# Patient Record
Sex: MALE | Race: WHITE | NOT HISPANIC OR LATINO
[De-identification: names, ages, dates, MRNs, and addresses within clinical notes are randomized per-mention and may not be internally consistent; named-entity substitution may affect disease eponyms.]

---

## 2018-03-09 PROBLEM — Z00.00 ENCOUNTER FOR PREVENTIVE HEALTH EXAMINATION: Status: ACTIVE | Noted: 2018-03-09

## 2018-03-23 ENCOUNTER — APPOINTMENT (OUTPATIENT)
Dept: NEPHROLOGY | Facility: CLINIC | Age: 63
End: 2018-03-23
Payer: COMMERCIAL

## 2018-03-23 VITALS
DIASTOLIC BLOOD PRESSURE: 60 MMHG | BODY MASS INDEX: 33.5 KG/M2 | HEIGHT: 70 IN | SYSTOLIC BLOOD PRESSURE: 130 MMHG | HEART RATE: 78 BPM | WEIGHT: 234 LBS

## 2018-03-23 PROCEDURE — 99204 OFFICE O/P NEW MOD 45 MIN: CPT

## 2018-03-23 RX ORDER — CALCIPOTRIENE 50 UG/G
0.01 CREAM TOPICAL
Qty: 120 | Refills: 0 | Status: ACTIVE | COMMUNITY
Start: 2018-02-05

## 2018-04-20 ENCOUNTER — APPOINTMENT (OUTPATIENT)
Dept: NEPHROLOGY | Facility: CLINIC | Age: 63
End: 2018-04-20
Payer: COMMERCIAL

## 2018-04-20 VITALS
BODY MASS INDEX: 33 KG/M2 | WEIGHT: 230 LBS | DIASTOLIC BLOOD PRESSURE: 60 MMHG | SYSTOLIC BLOOD PRESSURE: 128 MMHG | HEART RATE: 76 BPM

## 2018-04-20 PROCEDURE — 99214 OFFICE O/P EST MOD 30 MIN: CPT

## 2018-10-19 ENCOUNTER — APPOINTMENT (OUTPATIENT)
Dept: NEPHROLOGY | Facility: CLINIC | Age: 63
End: 2018-10-19

## 2018-11-28 ENCOUNTER — APPOINTMENT (OUTPATIENT)
Dept: NEPHROLOGY | Facility: CLINIC | Age: 63
End: 2018-11-28
Payer: COMMERCIAL

## 2018-11-28 VITALS — HEART RATE: 70 BPM | DIASTOLIC BLOOD PRESSURE: 70 MMHG | RESPIRATION RATE: 14 BRPM | SYSTOLIC BLOOD PRESSURE: 125 MMHG

## 2018-11-28 PROCEDURE — 99215 OFFICE O/P EST HI 40 MIN: CPT

## 2018-12-04 ENCOUNTER — TRANSCRIPTION ENCOUNTER (OUTPATIENT)
Age: 63
End: 2018-12-04

## 2018-12-28 ENCOUNTER — APPOINTMENT (OUTPATIENT)
Dept: INTERNAL MEDICINE | Facility: CLINIC | Age: 63
End: 2018-12-28

## 2019-04-30 ENCOUNTER — APPOINTMENT (OUTPATIENT)
Dept: NEPHROLOGY | Facility: CLINIC | Age: 64
End: 2019-04-30
Payer: COMMERCIAL

## 2019-04-30 VITALS — HEART RATE: 72 BPM | RESPIRATION RATE: 14 BRPM | DIASTOLIC BLOOD PRESSURE: 58 MMHG | SYSTOLIC BLOOD PRESSURE: 126 MMHG

## 2019-04-30 PROCEDURE — 99215 OFFICE O/P EST HI 40 MIN: CPT

## 2019-04-30 RX ORDER — METFORMIN HYDROCHLORIDE 1000 MG/1
1000 TABLET, COATED ORAL
Qty: 60 | Refills: 0 | Status: DISCONTINUED | COMMUNITY
Start: 2018-02-08 | End: 2019-04-30

## 2019-04-30 NOTE — PHYSICAL EXAM
[General Appearance - Alert] : alert [General Appearance - Well Nourished] : well nourished [Sclera] : the sclera and conjunctiva were normal [Outer Ear] : the ears and nose were normal in appearance [Examination Of The Oral Cavity] : the lips and gums were normal [Oropharynx] : the oropharynx was normal [Respiration, Rhythm And Depth] : normal respiratory rhythm and effort [Auscultation Breath Sounds / Voice Sounds] : lungs were clear to auscultation bilaterally [Heart Rate And Rhythm] : heart rate was normal and rhythm regular [Heart Sounds] : normal S1 and S2 [Murmurs] : no murmurs [Full Pulse] : the pedal pulses are present [Edema] : there was no peripheral edema [Bowel Sounds] : normal bowel sounds [Abdomen Soft] : soft [Abdomen Tenderness] : non-tender [Cervical Lymph Nodes Enlarged Posterior Bilaterally] : posterior cervical [No CVA Tenderness] : no ~M costovertebral angle tenderness [No Spinal Tenderness] : no spinal tenderness [Abnormal Walk] : normal gait [Musculoskeletal - Swelling] : no joint swelling seen [Skin Color & Pigmentation] : normal skin color and pigmentation [] : no rash [Cranial Nerves] : cranial nerves 2-12 were intact [No Focal Deficits] : no focal deficits [Oriented To Time, Place, And Person] : oriented to person, place, and time [Impaired Insight] : insight and judgment were intact

## 2019-05-01 NOTE — ASSESSMENT
[FreeTextEntry1] : 64yo Kisw[place]ili man with DMII, CKD III baseline Cr 2 here for f/u:\par \par # CKD IIIb w non nephrotic proteinuria\par Reviewed 4/20 results w pt. Cr 2 egfr 35 stable at baseline. 2.3g proteinuria, stable, glucosuria. pth 82 secondary hyperpara w normal vit D. confirmed on Losartan 100mg daily max dose.\par - renal sono 3/18 shows 12/11.3cm echogenic kidneys and no PVR. Consistent w diabetic kidney disease however per per optho said no retinopathy, will check secondary  w/u today\par \par # DMII - fair control HbA1C 7.2%, on dapaglifozin now instead of metformin. No retinopathy, upcoming optho deanna this summer. Can use metformin if needed as long as egfr >30. Referred to nutritionist last visit but didn't go. \par \par # Secondary Hyperparathyroidism - well controlled w/o meds, Vit D stores and Ph acceptable\par \par # HTN - well controlled\par \par

## 2019-05-01 NOTE — REVIEW OF SYSTEMS
[As Noted in HPI] : as noted in HPI [Recent Weight Gain (___ Lbs)] : no recent weight gain [Negative] : Constitutional

## 2019-05-01 NOTE — HISTORY OF PRESENT ILLNESS
[FreeTextEntry1] : 62yo Greenland[place] man with DMII, CKD III baseline Cr 2 here for f/u:\par \par PCP Dr. Winston Dawson\par \par Saw PCP a few weeks ago, may make changes in his diabetic medications. He wasn't sure what. overall well controlled he says. Feeling well. \par Checks BP regularly at home, usually 120's/70's. If his bp is high he feels dizzy, hasn't felt this at all recently. \par Weight overall stable, loses but then gains. Not exercising. \par \par OTC: No nsaids. \par \par All other ROS negative.

## 2019-05-03 ENCOUNTER — APPOINTMENT (OUTPATIENT)
Dept: NEPHROLOGY | Facility: CLINIC | Age: 64
End: 2019-05-03

## 2019-05-29 ENCOUNTER — INPATIENT (INPATIENT)
Facility: HOSPITAL | Age: 64
LOS: 0 days | Discharge: HOME | DRG: 355 | End: 2019-05-30
Attending: SURGERY | Admitting: SURGERY
Payer: COMMERCIAL

## 2019-05-29 VITALS
TEMPERATURE: 98 F | HEART RATE: 72 BPM | WEIGHT: 220.02 LBS | SYSTOLIC BLOOD PRESSURE: 131 MMHG | RESPIRATION RATE: 18 BRPM | HEIGHT: 69 IN | OXYGEN SATURATION: 98 % | DIASTOLIC BLOOD PRESSURE: 77 MMHG

## 2019-05-29 LAB
ALBUMIN SERPL ELPH-MCNC: 4 G/DL — SIGNIFICANT CHANGE UP (ref 3.3–5)
ALP SERPL-CCNC: 52 U/L — SIGNIFICANT CHANGE UP (ref 40–120)
ALT FLD-CCNC: 19 U/L — SIGNIFICANT CHANGE UP (ref 10–45)
ANION GAP SERPL CALC-SCNC: 13 MMOL/L — SIGNIFICANT CHANGE UP (ref 5–17)
APTT BLD: 26.2 SEC — LOW (ref 27.5–36.3)
AST SERPL-CCNC: 17 U/L — SIGNIFICANT CHANGE UP (ref 10–40)
BASOPHILS # BLD AUTO: 0.04 K/UL — SIGNIFICANT CHANGE UP (ref 0–0.2)
BASOPHILS NFR BLD AUTO: 0.5 % — SIGNIFICANT CHANGE UP (ref 0–2)
BILIRUB SERPL-MCNC: 0.3 MG/DL — SIGNIFICANT CHANGE UP (ref 0.2–1.2)
BUN SERPL-MCNC: 40 MG/DL — HIGH (ref 7–23)
CALCIUM SERPL-MCNC: 9 MG/DL — SIGNIFICANT CHANGE UP (ref 8.4–10.5)
CHLORIDE SERPL-SCNC: 104 MMOL/L — SIGNIFICANT CHANGE UP (ref 96–108)
CO2 SERPL-SCNC: 23 MMOL/L — SIGNIFICANT CHANGE UP (ref 22–31)
CREAT SERPL-MCNC: 2.18 MG/DL — HIGH (ref 0.5–1.3)
EOSINOPHIL # BLD AUTO: 0.26 K/UL — SIGNIFICANT CHANGE UP (ref 0–0.5)
EOSINOPHIL NFR BLD AUTO: 3.6 % — SIGNIFICANT CHANGE UP (ref 0–6)
GLUCOSE SERPL-MCNC: 148 MG/DL — HIGH (ref 70–99)
HCT VFR BLD CALC: 41.5 % — SIGNIFICANT CHANGE UP (ref 39–50)
HGB BLD-MCNC: 13.9 G/DL — SIGNIFICANT CHANGE UP (ref 13–17)
IMM GRANULOCYTES NFR BLD AUTO: 0.3 % — SIGNIFICANT CHANGE UP (ref 0–1.5)
INR BLD: 0.91 — SIGNIFICANT CHANGE UP (ref 0.88–1.16)
LIDOCAIN IGE QN: 47 U/L — SIGNIFICANT CHANGE UP (ref 7–60)
LYMPHOCYTES # BLD AUTO: 2.08 K/UL — SIGNIFICANT CHANGE UP (ref 1–3.3)
LYMPHOCYTES # BLD AUTO: 28.5 % — SIGNIFICANT CHANGE UP (ref 13–44)
MAGNESIUM SERPL-MCNC: 2 MG/DL — SIGNIFICANT CHANGE UP (ref 1.6–2.6)
MCHC RBC-ENTMCNC: 29.8 PG — SIGNIFICANT CHANGE UP (ref 27–34)
MCHC RBC-ENTMCNC: 33.5 GM/DL — SIGNIFICANT CHANGE UP (ref 32–36)
MCV RBC AUTO: 89.1 FL — SIGNIFICANT CHANGE UP (ref 80–100)
MONOCYTES # BLD AUTO: 0.71 K/UL — SIGNIFICANT CHANGE UP (ref 0–0.9)
MONOCYTES NFR BLD AUTO: 9.7 % — SIGNIFICANT CHANGE UP (ref 2–14)
NEUTROPHILS # BLD AUTO: 4.18 K/UL — SIGNIFICANT CHANGE UP (ref 1.8–7.4)
NEUTROPHILS NFR BLD AUTO: 57.4 % — SIGNIFICANT CHANGE UP (ref 43–77)
NRBC # BLD: 0 /100 WBCS — SIGNIFICANT CHANGE UP (ref 0–0)
PLATELET # BLD AUTO: 187 K/UL — SIGNIFICANT CHANGE UP (ref 150–400)
POTASSIUM SERPL-MCNC: 3.8 MMOL/L — SIGNIFICANT CHANGE UP (ref 3.5–5.3)
POTASSIUM SERPL-SCNC: 3.8 MMOL/L — SIGNIFICANT CHANGE UP (ref 3.5–5.3)
PROT SERPL-MCNC: 6.8 G/DL — SIGNIFICANT CHANGE UP (ref 6–8.3)
PROTHROM AB SERPL-ACNC: 10.3 SEC — SIGNIFICANT CHANGE UP (ref 10–12.9)
RBC # BLD: 4.66 M/UL — SIGNIFICANT CHANGE UP (ref 4.2–5.8)
RBC # FLD: 11.9 % — SIGNIFICANT CHANGE UP (ref 10.3–14.5)
SODIUM SERPL-SCNC: 140 MMOL/L — SIGNIFICANT CHANGE UP (ref 135–145)
WBC # BLD: 7.29 K/UL — SIGNIFICANT CHANGE UP (ref 3.8–10.5)
WBC # FLD AUTO: 7.29 K/UL — SIGNIFICANT CHANGE UP (ref 3.8–10.5)

## 2019-05-29 PROCEDURE — 99285 EMERGENCY DEPT VISIT HI MDM: CPT | Mod: 25

## 2019-05-29 PROCEDURE — 71045 X-RAY EXAM CHEST 1 VIEW: CPT | Mod: 26

## 2019-05-29 PROCEDURE — 93010 ELECTROCARDIOGRAM REPORT: CPT | Mod: NC

## 2019-05-29 RX ORDER — AMPICILLIN SODIUM AND SULBACTAM SODIUM 250; 125 MG/ML; MG/ML
3 INJECTION, POWDER, FOR SUSPENSION INTRAMUSCULAR; INTRAVENOUS ONCE
Refills: 0 | Status: COMPLETED | OUTPATIENT
Start: 2019-05-29 | End: 2019-05-29

## 2019-05-29 NOTE — ED ADULT TRIAGE NOTE - NS ED NURSE DIRECT TO ROOM YN
HISTORY OF PRESENT ILLNESS  Amelia Sharp is a 81 y.o. female. HPI  Depression Review:  Patient is seen for followup of depression. Treatment includes Prozac and had been on 10 mg every other day and doing well-ran out about 1 mo ago and off med more irritable and does not feel as well. Ongoing symptoms include irritable. She denies insomnia, hypersomnia and psychomotor agitation. She experiences the following side effects from the treatment: none. She is on fosamax and no gi sxs-due for follow up dexfrancesca  Uses clinoril rarely for back pain about 20 pills in last year only  Review of Systems   Constitutional: Negative for fever. Gastrointestinal: Negative for abdominal pain, heartburn and nausea. Musculoskeletal: Positive for back pain. Psychiatric/Behavioral: Negative for depression. The patient is not nervous/anxious and does not have insomnia. Physical Exam   Constitutional: She is oriented to person, place, and time. She appears well-developed and well-nourished. HENT:   Head: Normocephalic and atraumatic. Neck: Normal range of motion. Neck supple. Carotid bruit is not present. No thyromegaly present. Cardiovascular: Normal rate, regular rhythm, S1 normal, S2 normal, normal heart sounds and intact distal pulses. No murmur heard. Pulmonary/Chest: Effort normal and breath sounds normal. No respiratory distress. She has no wheezes. She has no rales. Musculoskeletal: She exhibits no edema. Neurological: She is alert and oriented to person, place, and time. Psychiatric: She has a normal mood and affect. Her behavior is normal.   Nursing note and vitals reviewed. ASSESSMENT and PLAN  Diagnoses and all orders for this visit:    1. Spondylosis of lumbar region without myelopathy or radiculopathy  -     sulindac (CLINORIL) 200 mg tablet; Take 1 Tab by mouth daily as needed. appt needed for further refills    2. Dysthymia  -     FLUoxetine (PROZAC) 10 mg capsule;  Take 1 cap by mouth every fourty-eight (48) hours for 90 doses. 3. Localized osteoporosis without current pathological fracture  -     DEXA BONE DENSITY STUDY AXIAL; Future    4. Breast cancer screening  -     GEOVANY 3D JENNIFER W MAMMO BI SCREENING INCL CAD;  Future No

## 2019-05-29 NOTE — ED ADULT NURSE NOTE - NSIMPLEMENTINTERV_GEN_ALL_ED
Implemented All Universal Safety Interventions:  [place] to call system. Call bell, personal items and telephone within reach. Instruct patient to call for assistance. Room bathroom lighting operational. Non-slip footwear when patient is off stretcher. Physically safe environment: no spills, clutter or unnecessary equipment. Stretcher in lowest position, wheels locked, appropriate side rails in place.

## 2019-05-29 NOTE — ED PROVIDER NOTE - OBJECTIVE STATEMENT
63M hx htn, dm, high chol, c/o umbilical pain for past 10 days. pt states increasing redness.  no vomiting, no fevers. no sick contacts. no prior abd surgeries. advised to come to ED per Dr. Llamas.

## 2019-05-29 NOTE — ED PROVIDER NOTE - CLINICAL SUMMARY MEDICAL DECISION MAKING FREE TEXT BOX
umbilical hernia, concern for possible surrounding cellulitis as erythematous  -check labs, ekg, cxr  -seen by surgery - recommend unasyn and admission to [facility] as will likely need surgery

## 2019-05-30 ENCOUNTER — TRANSCRIPTION ENCOUNTER (OUTPATIENT)
Age: 64
End: 2019-05-30

## 2019-05-30 ENCOUNTER — RESULT REVIEW (OUTPATIENT)
Age: 64
End: 2019-05-30

## 2019-05-30 VITALS
HEART RATE: 75 BPM | OXYGEN SATURATION: 95 % | RESPIRATION RATE: 16 BRPM | TEMPERATURE: 98 F | DIASTOLIC BLOOD PRESSURE: 79 MMHG | SYSTOLIC BLOOD PRESSURE: 147 MMHG

## 2019-05-30 DIAGNOSIS — N18.3 CHRONIC KIDNEY DISEASE, STAGE 3 (MODERATE): ICD-10-CM

## 2019-05-30 DIAGNOSIS — E78.00 PURE HYPERCHOLESTEROLEMIA, UNSPECIFIED: ICD-10-CM

## 2019-05-30 DIAGNOSIS — E11.9 TYPE 2 DIABETES MELLITUS WITHOUT COMPLICATIONS: ICD-10-CM

## 2019-05-30 DIAGNOSIS — Z01.810 ENCOUNTER FOR PREPROCEDURAL CARDIOVASCULAR EXAMINATION: ICD-10-CM

## 2019-05-30 DIAGNOSIS — K42.9 UMBILICAL HERNIA WITHOUT OBSTRUCTION OR GANGRENE: ICD-10-CM

## 2019-05-30 DIAGNOSIS — I10 ESSENTIAL (PRIMARY) HYPERTENSION: ICD-10-CM

## 2019-05-30 LAB
ANION GAP SERPL CALC-SCNC: 14 MMOL/L — SIGNIFICANT CHANGE UP (ref 5–17)
APPEARANCE UR: CLEAR — SIGNIFICANT CHANGE UP
BACTERIA # UR AUTO: PRESENT /HPF
BILIRUB UR-MCNC: NEGATIVE — SIGNIFICANT CHANGE UP
BLD GP AB SCN SERPL QL: NEGATIVE — SIGNIFICANT CHANGE UP
BLD GP AB SCN SERPL QL: NEGATIVE — SIGNIFICANT CHANGE UP
BUN SERPL-MCNC: 37 MG/DL — HIGH (ref 7–23)
CALCIUM SERPL-MCNC: 9.1 MG/DL — SIGNIFICANT CHANGE UP (ref 8.4–10.5)
CHLORIDE SERPL-SCNC: 106 MMOL/L — SIGNIFICANT CHANGE UP (ref 96–108)
CO2 SERPL-SCNC: 21 MMOL/L — LOW (ref 22–31)
COLOR SPEC: YELLOW — SIGNIFICANT CHANGE UP
CREAT SERPL-MCNC: 2 MG/DL — HIGH (ref 0.5–1.3)
DIFF PNL FLD: ABNORMAL
EPI CELLS # UR: SIGNIFICANT CHANGE UP /HPF (ref 0–5)
GLUCOSE BLDC GLUCOMTR-MCNC: 126 MG/DL — HIGH (ref 70–99)
GLUCOSE BLDC GLUCOMTR-MCNC: 142 MG/DL — HIGH (ref 70–99)
GLUCOSE SERPL-MCNC: 160 MG/DL — HIGH (ref 70–99)
GLUCOSE UR QL: 500
GRAN CASTS # UR COMP ASSIST: ABNORMAL /LPF
HBA1C BLD-MCNC: 7 % — HIGH (ref 4–5.6)
HCT VFR BLD CALC: 44.4 % — SIGNIFICANT CHANGE UP (ref 39–50)
HCV AB S/CO SERPL IA: 0.1 S/CO — SIGNIFICANT CHANGE UP
HCV AB SERPL-IMP: SIGNIFICANT CHANGE UP
HGB BLD-MCNC: 14.4 G/DL — SIGNIFICANT CHANGE UP (ref 13–17)
KETONES UR-MCNC: NEGATIVE — SIGNIFICANT CHANGE UP
LEUKOCYTE ESTERASE UR-ACNC: ABNORMAL
MCHC RBC-ENTMCNC: 29.5 PG — SIGNIFICANT CHANGE UP (ref 27–34)
MCHC RBC-ENTMCNC: 32.4 GM/DL — SIGNIFICANT CHANGE UP (ref 32–36)
MCV RBC AUTO: 91 FL — SIGNIFICANT CHANGE UP (ref 80–100)
NITRITE UR-MCNC: NEGATIVE — SIGNIFICANT CHANGE UP
NRBC # BLD: 0 /100 WBCS — SIGNIFICANT CHANGE UP (ref 0–0)
PH UR: 6 — SIGNIFICANT CHANGE UP (ref 5–8)
PLATELET # BLD AUTO: 187 K/UL — SIGNIFICANT CHANGE UP (ref 150–400)
POTASSIUM SERPL-MCNC: 3.9 MMOL/L — SIGNIFICANT CHANGE UP (ref 3.5–5.3)
POTASSIUM SERPL-SCNC: 3.9 MMOL/L — SIGNIFICANT CHANGE UP (ref 3.5–5.3)
PROT UR-MCNC: 100 MG/DL
RBC # BLD: 4.88 M/UL — SIGNIFICANT CHANGE UP (ref 4.2–5.8)
RBC # FLD: 11.9 % — SIGNIFICANT CHANGE UP (ref 10.3–14.5)
RBC CASTS # UR COMP ASSIST: < 5 /HPF — SIGNIFICANT CHANGE UP
RH IG SCN BLD-IMP: POSITIVE — SIGNIFICANT CHANGE UP
RH IG SCN BLD-IMP: POSITIVE — SIGNIFICANT CHANGE UP
SODIUM SERPL-SCNC: 141 MMOL/L — SIGNIFICANT CHANGE UP (ref 135–145)
SP GR SPEC: 1.01 — SIGNIFICANT CHANGE UP (ref 1–1.03)
UROBILINOGEN FLD QL: 0.2 E.U./DL — SIGNIFICANT CHANGE UP
WBC # BLD: 7.86 K/UL — SIGNIFICANT CHANGE UP (ref 3.8–10.5)
WBC # FLD AUTO: 7.86 K/UL — SIGNIFICANT CHANGE UP (ref 3.8–10.5)
WBC UR QL: < 5 /HPF — SIGNIFICANT CHANGE UP

## 2019-05-30 PROCEDURE — 99222 1ST HOSP IP/OBS MODERATE 55: CPT

## 2019-05-30 RX ORDER — HEPARIN SODIUM 5000 [USP'U]/ML
5000 INJECTION INTRAVENOUS; SUBCUTANEOUS EVERY 8 HOURS
Refills: 0 | Status: DISCONTINUED | OUTPATIENT
Start: 2019-05-30 | End: 2019-05-30

## 2019-05-30 RX ORDER — HYDROMORPHONE HYDROCHLORIDE 2 MG/ML
0.5 INJECTION INTRAMUSCULAR; INTRAVENOUS; SUBCUTANEOUS EVERY 4 HOURS
Refills: 0 | Status: DISCONTINUED | OUTPATIENT
Start: 2019-05-30 | End: 2019-05-30

## 2019-05-30 RX ORDER — SODIUM CHLORIDE 9 MG/ML
1000 INJECTION INTRAMUSCULAR; INTRAVENOUS; SUBCUTANEOUS ONCE
Refills: 0 | Status: COMPLETED | OUTPATIENT
Start: 2019-05-30 | End: 2019-05-30

## 2019-05-30 RX ORDER — ONDANSETRON 8 MG/1
4 TABLET, FILM COATED ORAL EVERY 6 HOURS
Refills: 0 | Status: DISCONTINUED | OUTPATIENT
Start: 2019-05-30 | End: 2019-05-30

## 2019-05-30 RX ORDER — SODIUM CHLORIDE 9 MG/ML
1000 INJECTION, SOLUTION INTRAVENOUS
Refills: 0 | Status: DISCONTINUED | OUTPATIENT
Start: 2019-05-30 | End: 2019-05-30

## 2019-05-30 RX ORDER — DEXTROSE 50 % IN WATER 50 %
12.5 SYRINGE (ML) INTRAVENOUS ONCE
Refills: 0 | Status: DISCONTINUED | OUTPATIENT
Start: 2019-05-30 | End: 2019-05-30

## 2019-05-30 RX ORDER — GLUCAGON INJECTION, SOLUTION 0.5 MG/.1ML
1 INJECTION, SOLUTION SUBCUTANEOUS ONCE
Refills: 0 | Status: DISCONTINUED | OUTPATIENT
Start: 2019-05-30 | End: 2019-05-30

## 2019-05-30 RX ORDER — INSULIN LISPRO 100/ML
VIAL (ML) SUBCUTANEOUS
Refills: 0 | Status: DISCONTINUED | OUTPATIENT
Start: 2019-05-30 | End: 2019-05-30

## 2019-05-30 RX ORDER — OXYCODONE HYDROCHLORIDE 5 MG/1
1 TABLET ORAL
Qty: 30 | Refills: 0
Start: 2019-05-30

## 2019-05-30 RX ORDER — SODIUM CHLORIDE 9 MG/ML
1000 INJECTION INTRAMUSCULAR; INTRAVENOUS; SUBCUTANEOUS
Refills: 0 | Status: DISCONTINUED | OUTPATIENT
Start: 2019-05-30 | End: 2019-05-30

## 2019-05-30 RX ORDER — HEPARIN SODIUM 5000 [USP'U]/ML
7500 INJECTION INTRAVENOUS; SUBCUTANEOUS EVERY 8 HOURS
Refills: 0 | Status: DISCONTINUED | OUTPATIENT
Start: 2019-05-30 | End: 2019-05-30

## 2019-05-30 RX ORDER — HYDROMORPHONE HYDROCHLORIDE 2 MG/ML
1 INJECTION INTRAMUSCULAR; INTRAVENOUS; SUBCUTANEOUS EVERY 4 HOURS
Refills: 0 | Status: DISCONTINUED | OUTPATIENT
Start: 2019-05-30 | End: 2019-05-30

## 2019-05-30 RX ORDER — CEPHALEXIN 500 MG
250 CAPSULE ORAL EVERY 6 HOURS
Refills: 0 | Status: DISCONTINUED | OUTPATIENT
Start: 2019-05-30 | End: 2019-05-30

## 2019-05-30 RX ORDER — POTASSIUM CHLORIDE 20 MEQ
10 PACKET (EA) ORAL
Refills: 0 | Status: COMPLETED | OUTPATIENT
Start: 2019-05-30 | End: 2019-05-30

## 2019-05-30 RX ORDER — CEFAZOLIN SODIUM 1 G
1000 VIAL (EA) INJECTION EVERY 8 HOURS
Refills: 0 | Status: DISCONTINUED | OUTPATIENT
Start: 2019-05-30 | End: 2019-05-30

## 2019-05-30 RX ORDER — DEXTROSE 50 % IN WATER 50 %
15 SYRINGE (ML) INTRAVENOUS ONCE
Refills: 0 | Status: DISCONTINUED | OUTPATIENT
Start: 2019-05-30 | End: 2019-05-30

## 2019-05-30 RX ADMIN — AMPICILLIN SODIUM AND SULBACTAM SODIUM 200 GRAM(S): 250; 125 INJECTION, POWDER, FOR SUSPENSION INTRAMUSCULAR; INTRAVENOUS at 00:30

## 2019-05-30 RX ADMIN — SODIUM CHLORIDE 500 MILLILITER(S): 9 INJECTION INTRAMUSCULAR; INTRAVENOUS; SUBCUTANEOUS at 01:39

## 2019-05-30 RX ADMIN — HEPARIN SODIUM 5000 UNIT(S): 5000 INJECTION INTRAVENOUS; SUBCUTANEOUS at 05:41

## 2019-05-30 RX ADMIN — Medication 100 MILLIEQUIVALENT(S): at 11:26

## 2019-05-30 RX ADMIN — Medication 250 MILLIGRAM(S): at 05:40

## 2019-05-30 RX ADMIN — Medication 100 MILLIEQUIVALENT(S): at 12:49

## 2019-05-30 RX ADMIN — Medication 100 MILLIGRAM(S): at 08:39

## 2019-05-30 NOTE — DISCHARGE NOTE NURSING/CASE MANAGEMENT/SOCIAL WORK - NSDCDPATPORTLINK_GEN_ALL_CORE
You can access the [facility] Patient Portal, offered by [facility], by registering with the following website: [email or web address]

## 2019-05-30 NOTE — DISCHARGE NOTE PROVIDER - NSDCCPCAREPLAN_GEN_ALL_CORE_FT
PRINCIPAL DISCHARGE DIAGNOSIS  Diagnosis: Umbilical hernia  Assessment and Plan of Treatment: Please follow up with Dr. Llamas in 10 days in [place].  Call the office to schedule your appointment.  You may shower using soap and water over incision sites starting 24 hours after surgery.  Do not scrub incision sites and pat dry when done.  No tub bathing or swimming until confirmation during your follow up appointment.  Keep incision sites out of the sun, as scars will darken.  Ambulate as tolerated, but no heavy lifting (>10lbs) or strenuous exercise.  You may resume regular diet.  You should be urinating at least 3-4x per day.  Call the office if you experience increasing pain, abdominal pain, nausea, vomiting, or temperature >101.4F.  1) Please take Acetaminophen 1,000mg by mouth every 8 hours (maximum of 3,000mg over 24 hours) and Ibuprofen 400mg by mouth every 6 hours (maximum 2,400 mg over 24 hours) as needed for pain  2) Please take Oxycodone 5mg by mouth every 6 hours as needed for severe pain

## 2019-05-30 NOTE — PROGRESS NOTE ADULT - ASSESSMENT
63 M PMH of DM, HTN, Dyslipidemia, no previous surgeries, present with umbilical hernia with skin changes due to cellulitis +/- skin necrosis, admitted for umbilical hernia repair    NPO  IV fluid 140ml/hr, 1L bolus given elevated Cr  Keflex Po 250mg Q6h for cellulitis  Holding BP meds  ISS for DM  Consult  this morning for preop clearance  added on to the OR schedule, pending medicine evaluation 63 M PMH of DM, HTN, Dyslipidemia, no previous surgeries, present with umbilical hernia with skin changes due to cellulitis +/- skin necrosis, admitted for umbilical hernia repair    NPO  IV fluid 140ml/hr, 1L bolus given elevated Cr  Renally dosed IV Antibiotic regimen for umbilical cellulitis  Holding BP meds  ISS for DM  Consult  this morning for preop clearance  added on to the OR schedule, pending medicine evaluation

## 2019-05-30 NOTE — PROGRESS NOTE ADULT - SUBJECTIVE AND OBJECTIVE BOX
SUBJECTIVE: Afebrile, VSS. Pain well controlled, denies N/V. Denies flatus, BM, making appropriate UO.    cephalexin 250 milliGRAM(s) Oral every 6 hours  heparin  Injectable 5000 Unit(s) SubCutaneous every 8 hours      Vital Signs Last 24 Hrs  T(C): 36.5 (30 May 2019 04:50), Max: 36.6 (29 May 2019 22:56)  T(F): 97.7 (30 May 2019 04:50), Max: 97.9 (29 May 2019 22:56)  HR: 72 (30 May 2019 04:50) (63 - 72)  BP: 143/83 (30 May 2019 04:50) (131/77 - 143/83)  BP(mean): --  RR: 17 (30 May 2019 04:50) (16 - 18)  SpO2: 98% (30 May 2019 04:50) (95% - 98%)    General: NAD, resting comfortably in bed  Pulm: Nonlabored breathing, no respiratory distress  Abd: soft, noderately distended, mild tenderness of umbilicus, no rebound, no guarding, 2x3 cm umbilical hernia, reducible, tender to palpation with overlying erythema  Extrem: WWP, no edema, SCDs in place      LABS:                        14.4   7.86  )-----------( 187      ( 30 May 2019 06:56 )             44.4     05-29    140  |  104  |  40<H>  ----------------------------<  148<H>  3.8   |  23  |  2.18<H>    Ca    9.0      29 May 2019 23:28  Mg     2.0     05-29    TPro  6.8  /  Alb  4.0  /  TBili  0.3  /  DBili  x   /  AST  17  /  ALT  19  /  AlkPhos  52  05-29    PT/INR - ( 29 May 2019 23:28 )   PT: 10.3 sec;   INR: 0.91          PTT - ( 29 May 2019 23:28 )  PTT:26.2 sec

## 2019-05-30 NOTE — CONSULT NOTE ADULT - PROBLEM SELECTOR RECOMMENDATION 9
Pt has a good functional status, he is cleared to proceed with surgery.  CRFs include HTN, HLD, T2D and CKD

## 2019-05-30 NOTE — DISCHARGE NOTE PROVIDER - CARE PROVIDER_API CALL
Andrade Llamas)  Surgery  [address] [place] [address], [address]  [place], [place] [address]  Phone: [phone]  Fax: [phone]  Follow Up Time:

## 2019-05-30 NOTE — H&P ADULT - NSHPPHYSICALEXAM_GEN_ALL_CORE
Vital Signs Last 24 Hrs  T(C): 36.6 (29 May 2019 22:56), Max: 36.6 (29 May 2019 22:56)  T(F): 97.9 (29 May 2019 22:56), Max: 97.9 (29 May 2019 22:56)  HR: 72 (29 May 2019 22:56) (72 - 72)  BP: 131/77 (29 May 2019 22:56) (131/77 - 131/77)  BP(mean): --  RR: 18 (29 May 2019 22:56) (18 - 18)  SpO2: 98% (29 May 2019 22:56) (98% - 98%)    Gen: Awake, alert and oriented  Resp: non labored, not in distress  Abdomen:  No previous surgical incision  umbilical and supraumbilical hernia, containing fat, no evidence of incarcerated content   Skin changes over the hernia, mild erythema and ulceration, no fluctuance or collection  Soft, ND, mild tenderness over the hernia site

## 2019-05-30 NOTE — H&P ADULT - HISTORY OF PRESENT ILLNESS
63 Year old, PMH of DM, HTN, Dyslipidemia, no previous surgeries, present with umbilical hernia that has been increasing in size and getting stuck (according to the patient), he complains of pain and discomfort, 1 week ago he started some erythema and ulceration over it, he denies any nausea/vomiting, no constipation, able to pass stool and gas, afebrile, no hematuria or dysuria

## 2019-05-30 NOTE — CONSULT NOTE ADULT - SUBJECTIVE AND OBJECTIVE BOX
Patient is a 63y old  Male who presents with a chief complaint of Supraumbilical and umbilical hernia with skin necrosis (30 May 2019 07:21)      HPI:  63 Year old, PMH of DM, HTN, Dyslipidemia, no previous surgeries, present with umbilical hernia that has been increasing in size and getting stuck (according to the patient), he complains of pain and discomfort, 1 week ago he started some erythema and ulceration over it, he denies any nausea/vomiting, no constipation, able to pass stool and gas, afebrile, no hematuria or dysuria (30 May 2019 00:45)      PAST MEDICAL & SURGICAL HISTORY:  High cholesterol  HTN (hypertension)  DM (diabetes mellitus)  No significant past surgical history      PREVIOUS DIAGNOSTIC TESTING:      ECHO  FINDINGS:    STRESS  FINDINGS:    CATHETERIZATION  FINDINGS:    MEDICATIONS  (STANDING):  dextrose 5%. 1000 milliLiter(s) (50 mL/Hr) IV Continuous <Continuous>  dextrose 50% Injectable 12.5 Gram(s) IV Push once  heparin  Injectable 5000 Unit(s) SubCutaneous every 8 hours  insulin lispro (HumaLOG) corrective regimen sliding scale   SubCutaneous Before meals and at bedtime  lactated ringers. 1000 milliLiter(s) (140 mL/Hr) IV Continuous <Continuous>    MEDICATIONS  (PRN):  dextrose 40% Gel 15 Gram(s) Oral once PRN Blood Glucose LESS THAN 70 milliGRAM(s)/deciliter  glucagon  Injectable 1 milliGRAM(s) IntraMuscular once PRN Glucose LESS THAN 70 milligrams/deciliter  HYDROmorphone  Injectable 0.5 milliGRAM(s) IV Push every 4 hours PRN Moderate Pain (4 - 6)  HYDROmorphone  Injectable 1 milliGRAM(s) IV Push every 4 hours PRN Severe Pain (7 - 10)  ondansetron Injectable 4 milliGRAM(s) IV Push every 6 hours PRN Nausea      FAMILY HISTORY:      SOCIAL HISTORY:    CIGARETTES: no    ALCOHOL:   no    REVIEW OF SYSTEMS:  CONSTITUTIONAL: No fever, weight loss, or fatigue  EYES: No eye pain, visual disturbances, or discharge  ENMT:  No difficulty hearing, tinnitus, vertigo; No sinus or throat pain  NECK: No pain or stiffness  RESPIRATORY: No cough, wheezing, chills or hemoptysis; No Shortness of Breath  CARDIOVASCULAR: No chest pain, palpitations, passing out, dizziness, or leg swelling  GASTROINTESTINAL: + abdominal or epigastric pain. No nausea, vomiting, or hematemesis; No diarrhea or constipation. No melena or hematochezia.  GENITOURINARY: No dysuria, frequency, hematuria, or incontinence  NEUROLOGICAL: No headaches, memory loss, loss of strength, numbness, or tremors  SKIN: No itching, burning, rashes, or lesions   LYMPH Nodes: No enlarged glands  ENDOCRINE: No heat or cold intolerance; No hair loss  MUSCULOSKELETAL: No joint pain or swelling; No muscle, back, or extremity pain  PSYCHIATRIC: No depression, anxiety, mood swings, or difficulty sleeping  HEME/LYMPH: No easy bruising, or bleeding gums  ALLERY AND IMMUNOLOGIC: No hives or eczema  	  Vital Signs Last 24 Hrs  T(C): 36.5 (30 May 2019 04:50), Max: 36.6 (29 May 2019 22:56)  T(F): 97.7 (30 May 2019 04:50), Max: 97.9 (29 May 2019 22:56)  HR: 72 (30 May 2019 04:50) (63 - 72)  BP: 143/83 (30 May 2019 04:50) (131/77 - 143/83)  BP(mean): --  RR: 17 (30 May 2019 04:50) (16 - 18)  SpO2: 98% (30 May 2019 04:50) (95% - 98%)    PHYSICAL EXAM:  Appearance: Normal	  HEENT:   Normal oral mucosa, PERRL, EOMI	  Lymphatic: No lymphadenopathy  Cardiovascular: Normal S1 S2, No JVD, No murmurs, No edema  Respiratory: Lungs clear to auscultation	  Psychiatry: A & O x 3, Mood & affect appropriate  Gastrointestinal:  Soft, + BS, skin necrosis over umbilical ulcer	  Skin: No rashes, No ecchymoses, No cyanosis  Neurologic: Non-focal  Extremities: Normal range of motion, No clubbing, cyanosis or edema  Vascular: Peripheral pulses palpable 2+ bilaterally      INTERPRETATION OF TELEMETRY:    ECG:    I&O's Detail    29 May 2019 07:01  -  30 May 2019 07:00  --------------------------------------------------------  IN:  Total IN: 0 mL    OUT:    Voided: 600 mL  Total OUT: 600 mL    Total NET: -600 mL          LABS:                        14.4   7.86  )-----------( 187      ( 30 May 2019 06:56 )             44.4         141  |  106  |  37<H>  ----------------------------<  160<H>  3.9   |  21<L>  |  2.00<H>    Ca    9.1      30 May 2019 06:56  Mg     2.0         TPro  6.8  /  Alb  4.0  /  TBili  0.3  /  DBili  x   /  AST  17  /  ALT  19  /  AlkPhos  52          PT/INR - ( 29 May 2019 23:28 )   PT: 10.3 sec;   INR: 0.91          PTT - ( 29 May 2019 23:28 )  PTT:26.2 sec  Urinalysis Basic - ( 30 May 2019 01:34 )    Color: Yellow / Appearance: Clear / S.015 / pH: x  Gluc: x / Ketone: NEGATIVE  / Bili: Negative / Urobili: 0.2 E.U./dL   Blood: x / Protein: 100 mg/dL / Nitrite: NEGATIVE   Leuk Esterase: Trace / RBC: < 5 /HPF / WBC < 5 /HPF   Sq Epi: x / Non Sq Epi: 0-5 /HPF / Bacteria: Present /HPF      I&O's Summary    29 May 2019 07:01  -  30 May 2019 07:00  --------------------------------------------------------  IN: 0 mL / OUT: 600 mL / NET: -600 mL      BNP  RADIOLOGY & ADDITIONAL STUDIES:

## 2019-05-30 NOTE — H&P ADULT - NSHPLABSRESULTS_GEN_ALL_CORE
13.9   7.29  )-----------( 187      ( 29 May 2019 23:28 )             41.5     05-29    140  |  104  |  40<H>  ----------------------------<  148<H>  3.8   |  23  |  2.18<H>    Ca    9.0      29 May 2019 23:28  Mg     2.0     05-29    TPro  6.8  /  Alb  4.0  /  TBili  0.3  /  DBili  x   /  AST  17  /  ALT  19  /  AlkPhos  52  05-29

## 2019-05-30 NOTE — H&P ADULT - ASSESSMENT
63 Year old, PMH of DM, HTN, Dyslipidemia, no previous surgeries, present with umbilical hernia with skin changes due to cellulitis +/- skin necrosis, admitted for umbilical hernia repair      Recs:  Admit to regional  NPO  IV fluid 140ml/hr, 1L bolus given elevated Cr  Keflex Po 250mg Q6h for cellulitis  Holding BP meds  ISS for DM  Consult  in the morning for preop clearance  added on to the OR schedule, pending medicine evaluation  Discussed with

## 2019-05-30 NOTE — DISCHARGE NOTE PROVIDER - HOSPITAL COURSE
63 Year old, PMH of DM, HTN, Dyslipidemia, no previous surgeries, present with umbilical hernia that has been increasing in size and getting stuck (according to the patient), he complains of pain and discomfort, 1 week ago he started some erythema and ulceration over it, he denies any nausea/vomiting, no constipation, able to pass stool and gas, afebrile, no hematuria or dysuria. The patient was admitted to [facility] on 5/29/2019 for an umbilical hernia with overlying skin ulceration.  The patient had this repaired in the OR on 5/30.  At the time of discharge, the patient's pain was controlled by oral medications, tolerated a regular diet, able to void, and had a soft/nontender abdomen with clean, dry, and intact incisions.

## 2019-05-31 LAB
ANION GAP SERPL CALC-SCNC: 9 MMOL/L — SIGNIFICANT CHANGE UP (ref 5–17)
BUN SERPL-MCNC: 19 MG/DL — SIGNIFICANT CHANGE UP (ref 7–23)
CALCIUM SERPL-MCNC: 8.4 MG/DL — SIGNIFICANT CHANGE UP (ref 8.4–10.5)
CHLORIDE SERPL-SCNC: 100 MMOL/L — SIGNIFICANT CHANGE UP (ref 96–108)
CHOLEST SERPL-MCNC: 99 MG/DL — SIGNIFICANT CHANGE UP (ref 10–199)
CO2 SERPL-SCNC: 27 MMOL/L — SIGNIFICANT CHANGE UP (ref 22–31)
CREAT SERPL-MCNC: 1.13 MG/DL — SIGNIFICANT CHANGE UP (ref 0.5–1.3)
GLUCOSE SERPL-MCNC: 180 MG/DL — HIGH (ref 70–99)
HCT VFR BLD CALC: 38.9 % — LOW (ref 39–50)
HDLC SERPL-MCNC: 55 MG/DL — SIGNIFICANT CHANGE UP
HGB BLD-MCNC: 13.1 G/DL — SIGNIFICANT CHANGE UP (ref 13–17)
LIPID PNL WITH DIRECT LDL SERPL: 37 MG/DL — SIGNIFICANT CHANGE UP
MAGNESIUM SERPL-MCNC: 1.8 MG/DL — SIGNIFICANT CHANGE UP (ref 1.6–2.6)
MCHC RBC-ENTMCNC: 31.7 PG — SIGNIFICANT CHANGE UP (ref 27–34)
MCHC RBC-ENTMCNC: 33.7 GM/DL — SIGNIFICANT CHANGE UP (ref 32–36)
MCV RBC AUTO: 94.2 FL — SIGNIFICANT CHANGE UP (ref 80–100)
NRBC # BLD: 0 /100 WBCS — SIGNIFICANT CHANGE UP (ref 0–0)
PHOSPHATE SERPL-MCNC: 3.4 MG/DL — SIGNIFICANT CHANGE UP (ref 2.5–4.5)
PLATELET # BLD AUTO: 162 K/UL — SIGNIFICANT CHANGE UP (ref 150–400)
POTASSIUM SERPL-MCNC: 5.1 MMOL/L — SIGNIFICANT CHANGE UP (ref 3.5–5.3)
POTASSIUM SERPL-SCNC: 5.1 MMOL/L — SIGNIFICANT CHANGE UP (ref 3.5–5.3)
RBC # BLD: 4.13 M/UL — LOW (ref 4.2–5.8)
RBC # FLD: 11.5 % — SIGNIFICANT CHANGE UP (ref 10.3–14.5)
SODIUM SERPL-SCNC: 136 MMOL/L — SIGNIFICANT CHANGE UP (ref 135–145)
TOTAL CHOLESTEROL/HDL RATIO MEASUREMENT: 1.8 RATIO — LOW (ref 3.4–9.6)
TRIGL SERPL-MCNC: 34 MG/DL — SIGNIFICANT CHANGE UP (ref 10–149)
WBC # BLD: 17.18 K/UL — HIGH (ref 3.8–10.5)
WBC # FLD AUTO: 17.18 K/UL — HIGH (ref 3.8–10.5)

## 2019-06-03 PROCEDURE — 99285 EMERGENCY DEPT VISIT HI MDM: CPT

## 2019-06-03 PROCEDURE — 80053 COMPREHEN METABOLIC PANEL: CPT

## 2019-06-03 PROCEDURE — 80061 LIPID PANEL: CPT

## 2019-06-03 PROCEDURE — 93005 ELECTROCARDIOGRAM TRACING: CPT

## 2019-06-03 PROCEDURE — 36415 COLL VENOUS BLD VENIPUNCTURE: CPT

## 2019-06-03 PROCEDURE — 86850 RBC ANTIBODY SCREEN: CPT

## 2019-06-03 PROCEDURE — 83735 ASSAY OF MAGNESIUM: CPT

## 2019-06-03 PROCEDURE — 83690 ASSAY OF LIPASE: CPT

## 2019-06-03 PROCEDURE — 80048 BASIC METABOLIC PNL TOTAL CA: CPT

## 2019-06-03 PROCEDURE — 81001 URINALYSIS AUTO W/SCOPE: CPT

## 2019-06-03 PROCEDURE — 88304 TISSUE EXAM BY PATHOLOGIST: CPT

## 2019-06-03 PROCEDURE — 86803 HEPATITIS C AB TEST: CPT

## 2019-06-03 PROCEDURE — 71045 X-RAY EXAM CHEST 1 VIEW: CPT

## 2019-06-03 PROCEDURE — 86900 BLOOD TYPING SEROLOGIC ABO: CPT

## 2019-06-03 PROCEDURE — 85027 COMPLETE CBC AUTOMATED: CPT

## 2019-06-03 PROCEDURE — 85730 THROMBOPLASTIN TIME PARTIAL: CPT

## 2019-06-03 PROCEDURE — 84100 ASSAY OF PHOSPHORUS: CPT

## 2019-06-03 PROCEDURE — 85025 COMPLETE CBC W/AUTO DIFF WBC: CPT

## 2019-06-03 PROCEDURE — C1781: CPT

## 2019-06-03 PROCEDURE — 85610 PROTHROMBIN TIME: CPT

## 2019-06-03 PROCEDURE — 86901 BLOOD TYPING SEROLOGIC RH(D): CPT

## 2019-06-03 PROCEDURE — 82962 GLUCOSE BLOOD TEST: CPT

## 2019-06-03 PROCEDURE — 83036 HEMOGLOBIN GLYCOSYLATED A1C: CPT

## 2019-06-06 LAB — SURGICAL PATHOLOGY STUDY: SIGNIFICANT CHANGE UP

## 2019-06-10 DIAGNOSIS — K43.6 OTHER AND UNSPECIFIED VENTRAL HERNIA WITH OBSTRUCTION, WITHOUT GANGRENE: ICD-10-CM

## 2019-06-10 DIAGNOSIS — E78.5 HYPERLIPIDEMIA, UNSPECIFIED: ICD-10-CM

## 2019-06-10 DIAGNOSIS — I12.9 HYPERTENSIVE CHRONIC KIDNEY DISEASE WITH STAGE 1 THROUGH STAGE 4 CHRONIC KIDNEY DISEASE, OR UNSPECIFIED CHRONIC KIDNEY DISEASE: ICD-10-CM

## 2019-06-10 DIAGNOSIS — E11.9 TYPE 2 DIABETES MELLITUS WITHOUT COMPLICATIONS: ICD-10-CM

## 2019-06-10 DIAGNOSIS — N18.3 CHRONIC KIDNEY DISEASE, STAGE 3 (MODERATE): ICD-10-CM

## 2019-06-10 DIAGNOSIS — K42.0 UMBILICAL HERNIA WITH OBSTRUCTION, WITHOUT GANGRENE: ICD-10-CM

## 2020-04-29 ENCOUNTER — APPOINTMENT (OUTPATIENT)
Dept: NEPHROLOGY | Facility: CLINIC | Age: 65
End: 2020-04-29

## 2020-04-30 PROBLEM — E78.00 PURE HYPERCHOLESTEROLEMIA, UNSPECIFIED: Chronic | Status: ACTIVE | Noted: 2019-05-29

## 2020-04-30 PROBLEM — I10 ESSENTIAL (PRIMARY) HYPERTENSION: Chronic | Status: ACTIVE | Noted: 2019-05-29

## 2020-05-04 ENCOUNTER — APPOINTMENT (OUTPATIENT)
Dept: NEPHROLOGY | Facility: CLINIC | Age: 65
End: 2020-05-04
Payer: COMMERCIAL

## 2020-05-04 ENCOUNTER — TRANSCRIPTION ENCOUNTER (OUTPATIENT)
Age: 65
End: 2020-05-04

## 2020-05-04 PROCEDURE — 99214 OFFICE O/P EST MOD 30 MIN: CPT | Mod: 95

## 2020-05-04 NOTE — ASSESSMENT
[FreeTextEntry1] : 64-year-old man with relatively stable CKD 3B probably due to diabetic nephropathy but watching light chains/possible monoclonal gammopathy.  He has no back pain or hypercalcemia to suggest multiple myeloma.  Importantly, his sugar is poorly controlled and I suggested he see his PCP to discuss.  I also implored him to watch his diet more closely, and walk daily for at least 30 minutes.  I suggested he have blood work again within a few months and certainly see Dr. Ibarra within 5 to 6 months.

## 2020-05-04 NOTE — HISTORY OF PRESENT ILLNESS
[Home] : at home, [unfilled] , at the time of the visit. [Medical Office: ([facility])___] : at the medical office located in  [Patient] : the patient [Other:____] : [unfilled] [Self] : self [FreeTextEntry1] : Discussed with patient : You have chosen to receive care through the use of tele-media.  It enables healthcare providers at different locations to provide safe, effective, and convenient care through the use of technology.  Please note this is a billable encounter.  As with any healthcare service, there are risks associated with the use of tele-media, including  issues.  You understand that I cannot physically examine you and that you may need to come to the office to complete the assessment.   Patient agreed verbally and understands the risks and benefits of tele-media as explained.  All questions regarding tele-media encounters were answered.\par                                                                                                                                                                                         64-year-old man with a history of CKD 3B, type 2 diabetes, hypertension, hyperlipidemia, SH PT, metabolic acidosis, last seen in November 2018 by Dr. Ibarra.  His BP has generally been okay at home but occasionally spikes as high as 150/90, on amlodipine 10 mg daily and losartan 100 mg daily.  He has no uremic symptoms.  He has had no COVID symptoms.  His blood work last month shows basically stable renal function with a creatinine of 2.1 compared to 2.0 a year ago.  His PTH is normal.  But his A1c is 9.2, and he has not been following diet or exercising.  He is on metformin and farxiga.  He also exhibited a kappa light chains of 53 with normal up to 19, and lambda light chains of 39 with normal up to 26.  However his kappa to lambda ratio was normal.  His CINDY 2 was also normal.  He is HIV negative, hepatitis B and C-.  His hemoglobin is 15.  His uric acid is 8.4, but he has had no acute gout attacks.  His protein to creatinine ratio was 2833, but he denies edema.

## 2020-05-04 NOTE — PHYSICAL EXAM
[General Appearance - Alert] : alert [General Appearance - In No Acute Distress] : in no acute distress [PERRL With Normal Accommodation] : pupils were equal in size, round, and reactive to light [Sclera] : the sclera and conjunctiva were normal [Outer Ear] : the ears and nose were normal in appearance [Neck Cervical Mass (___cm)] : no neck mass was observed [Neck Appearance] : the appearance of the neck was normal [Skin Color & Pigmentation] : normal skin color and pigmentation [Skin Turgor] : normal skin turgor [] : no rash [Deep Tendon Reflexes (DTR)] : deep tendon reflexes were 2+ and symmetric [Sensation] : the sensory exam was normal to light touch and pinprick [No Focal Deficits] : no focal deficits [Oriented To Time, Place, And Person] : oriented to person, place, and time [Impaired Insight] : insight and judgment were intact [Affect] : the affect was normal

## 2021-02-18 ENCOUNTER — APPOINTMENT (OUTPATIENT)
Dept: NEPHROLOGY | Facility: CLINIC | Age: 66
End: 2021-02-18
Payer: COMMERCIAL

## 2021-02-18 VITALS
SYSTOLIC BLOOD PRESSURE: 124 MMHG | BODY MASS INDEX: 33.07 KG/M2 | DIASTOLIC BLOOD PRESSURE: 65 MMHG | HEIGHT: 70 IN | WEIGHT: 231 LBS

## 2021-02-18 PROCEDURE — 99214 OFFICE O/P EST MOD 30 MIN: CPT | Mod: 95

## 2021-02-18 NOTE — HISTORY OF PRESENT ILLNESS
[Home] : at home, [unfilled] , at the time of the visit. [Medical Office: ([facility])___] : at the medical office located in  [Verbal consent obtained from patient] : the patient, [unfilled] [FreeTextEntry1] : Discussed with patient : You have chosen to receive care through the use of tele-media.  It enables healthcare providers at different locations to provide safe, effective, and convenient care through the use of technology.  Please note this is a billable encounter.  As with any healthcare service, there are risks associated with the use of tele-media, including  issues.  You understand that I cannot physically examine you and that you may need to come to the office to complete the assessment.   Patient agreed verbally and understands the risks and benefits of tele-media as explained.  All questions regarding tele-media encounters were answered.\par        64-year-old man with a history of hypertension, type 2 diabetes, hyperlipidemia, secondary hyperparathyroidism, hyperuricemia last seen virtually in May 2020.  His creatinine was stable at that time in the range of 2.0-2.1.  However, it is now up to 2.43, and his GFR is down to 28, so he is now stage IV CKD.  His potassium is well controlled at 4.4, CO2 23, hemoglobin 14.4, uric acid 8.5, urine albumin to creatinine ratio has dropped from about 2800 down to 2203.  His calcium is 9.3 with a PTH of 113 and the lab failed to do phosphorus.  His BP is well controlled, running an average of about 125 at home systolic.  His blood sugar is problematic with an A1c of 10.4.  He has no apparent neuropathy or retinopathy.  His appetite is okay and he has no pruritus.  His daughter was on the call and is very supportive.

## 2021-02-18 NOTE — ASSESSMENT
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  I urged him to try to lower his A1c.  His diet is suboptimal, and he is too sedentary.  He is on Farxiga, which is excellent -the DAPA-CKD outcomes trial in the [place] [employer] in September demonstrated excellent cardio renal protection.  He has no history of gout attacks despite his elevated uric acid.  His hemoglobin remains remarkably high considering his GFR.  I have asked him to make an appointment again within 4 months, and sooner if if it appears that his renal function has worsened or his BP is elevated.

## 2021-05-04 DIAGNOSIS — N18.32 CHRONIC KIDNEY DISEASE, STAGE 3B: ICD-10-CM

## 2021-05-10 ENCOUNTER — APPOINTMENT (OUTPATIENT)
Dept: NEPHROLOGY | Facility: CLINIC | Age: 66
End: 2021-05-10
Payer: COMMERCIAL

## 2021-05-10 VITALS
DIASTOLIC BLOOD PRESSURE: 77 MMHG | BODY MASS INDEX: 32.93 KG/M2 | HEART RATE: 76 BPM | WEIGHT: 230 LBS | SYSTOLIC BLOOD PRESSURE: 128 MMHG | HEIGHT: 70 IN

## 2021-05-10 PROCEDURE — 99072 ADDL SUPL MATRL&STAF TM PHE: CPT

## 2021-05-10 PROCEDURE — 99214 OFFICE O/P EST MOD 30 MIN: CPT

## 2021-05-10 NOTE — HISTORY OF PRESENT ILLNESS
[FreeTextEntry1] : 65-year-old man with a history of hypertension and type 2 diabetes for years, hyperlipidemia, CKD 4, secondary hyperparathyroidism, hyperuricemia, with a surprisingly high hemoglobin for his level of renal function.  His creatinine is stable at about 2.4 with a K of 4.0, CO2 25.  His calcium and phosphorus are normal.  He has proteinuria that is 2+ on urinalysis and about 600 on microalbumin.  His ultrasound 3 years ago showed normal-sized kidneys with the left looking echogenic.  His home BP is good with a average of about 125 systolic.  His sugar is much better controlled now having dropped from an A1c of 10.4 down to 7.9.  Farxiga was added, and will hopefully provide cardiac and renal protection as well.  He plans to go to [facility] over the summer and will be more physically active there.

## 2021-05-10 NOTE — ASSESSMENT
[FreeTextEntry1] : 65-year-old man with well-controlled hypertension, improving but still suboptimal diabetes control, and stable renal function/stage IV CKD.  He has no gout symptoms.  He also has no pruritus, no evidence of neuropathy or retinopathy.  He will remain on to nephro protective medications, losartan 100 mg daily and Farxiga 5 mg daily.  He is reminded to avoid NSAIDs.  I encouraged him regarding his improved blood sugar and urged him to continue being diligent with diet and exercise.  I have ordered labs to be done before his next visit, including BMP, phosphorus, PTH, A1c, and urine microalbumin.  He will return in September, after coming back from [facility].

## 2021-05-10 NOTE — PHYSICAL EXAM
[General Appearance - Alert] : alert [General Appearance - In No Acute Distress] : in no acute distress [Sclera] : the sclera and conjunctiva were normal [PERRL With Normal Accommodation] : pupils were equal in size, round, and reactive to light [Outer Ear] : the ears and nose were normal in appearance [Neck Appearance] : the appearance of the neck was normal [Neck Cervical Mass (___cm)] : no neck mass was observed [Jugular Venous Distention Increased] : there was no jugular-venous distention [Skin Color & Pigmentation] : normal skin color and pigmentation [Skin Turgor] : normal skin turgor [] : no rash [Deep Tendon Reflexes (DTR)] : deep tendon reflexes were 2+ and symmetric [Sensation] : the sensory exam was normal to light touch and pinprick [No Focal Deficits] : no focal deficits [Oriented To Time, Place, And Person] : oriented to person, place, and time [Impaired Insight] : insight and judgment were intact [Affect] : the affect was normal

## 2021-05-10 NOTE — CONSULT LETTER
[Dear  ___] : Dear  [unfilled], [Consult Letter:] : I had the pleasure of evaluating your patient, [unfilled]. [Please see my note below.] : Please see my note below. [Consult Closing:] : Thank you very much for allowing me to participate in the care of this patient.  If you have any questions, please do not hesitate to contact me. [Sincerely,] : Sincerely, [FreeTextEntry2] : Dr Winston Dawson [FreeTextEntry3] : Sincerely, \par \par Phillip Ruiz MD, FACP

## 2021-11-01 ENCOUNTER — TRANSCRIPTION ENCOUNTER (OUTPATIENT)
Age: 66
End: 2021-11-01

## 2021-11-24 ENCOUNTER — APPOINTMENT (OUTPATIENT)
Dept: NEPHROLOGY | Facility: CLINIC | Age: 66
End: 2021-11-24
Payer: COMMERCIAL

## 2021-11-24 VITALS
BODY MASS INDEX: 32.93 KG/M2 | DIASTOLIC BLOOD PRESSURE: 73 MMHG | SYSTOLIC BLOOD PRESSURE: 124 MMHG | HEART RATE: 80 BPM | WEIGHT: 230 LBS | HEIGHT: 70 IN

## 2021-11-24 DIAGNOSIS — E78.1 PURE HYPERGLYCERIDEMIA: ICD-10-CM

## 2021-11-24 PROCEDURE — 99214 OFFICE O/P EST MOD 30 MIN: CPT

## 2021-11-24 NOTE — HISTORY OF PRESENT ILLNESS
[FreeTextEntry1] : 66-year-old man with a history of hypertension and type 2 diabetes for many years, hypercholesterolemia/hypertriglyceridemia, CKD 4, secondary hyperparathyroidism, hyperuricemia.  His last creatinine was in the 2.4 range but has now risen to 2.66 with a GFR down to 24, and a BUN of 45.  In addition, his CO2 has dropped from 25-19.  His urinary protein has increased from 2+ to 3+, and his U ACR from 600s up to 1357.  His triglycerides are 258.  His home BP is normal with the use of amlodipine 10 mg daily and losartan 100 mg daily.  He has been relatively sedentary since the warmer weather ended.  His A1c has dropped from 10.4 down to 7.8 with the addition of Farxiga.

## 2021-11-24 NOTE — ASSESSMENT
[FreeTextEntry1] : 66-year-old man with worsening CKD 4, and very high cardiovascular risk as a result of the combination of CKD, hypertension, diabetes, hyperlipidemia, and obesity.  Because of emerging metabolic acidosis, I have started him on sodium bicarbonate 650 mg twice daily.  Because of high cardiovascular risk and highly elevated triglycerides, I am adding Vascepa 2 g twice daily -not only should that improve his triglyceride levels, but a large outcomes trial suggests a dramatic improvement in reducing cardiovascular risk.  I have ordered labs to be repeated in 3 months, to include BMP, PTH, phosphorus, urine microalbumin and lipid profile.  I am pleased that he is on Farxiga and losartan, both of which will provide a measure of renal protection, as well as cardiovascular.

## 2021-11-24 NOTE — CONSULT LETTER
[Dear  ___] : Dear  [unfilled], [Consult Letter:] : I had the pleasure of evaluating your patient, [unfilled]. [Please see my note below.] : Please see my note below. [Consult Closing:] : Thank you very much for allowing me to participate in the care of this patient.  If you have any questions, please do not hesitate to contact me. [Sincerely,] : Sincerely, [FreeTextEntry2] : Dr carrie Leone

## 2022-03-09 ENCOUNTER — TRANSCRIPTION ENCOUNTER (OUTPATIENT)
Age: 67
End: 2022-03-09

## 2022-03-14 ENCOUNTER — APPOINTMENT (OUTPATIENT)
Dept: NEPHROLOGY | Facility: CLINIC | Age: 67
End: 2022-03-14
Payer: COMMERCIAL

## 2022-03-14 VITALS
HEIGHT: 70 IN | SYSTOLIC BLOOD PRESSURE: 116 MMHG | BODY MASS INDEX: 32.93 KG/M2 | WEIGHT: 230 LBS | HEART RATE: 76 BPM | DIASTOLIC BLOOD PRESSURE: 72 MMHG

## 2022-03-14 DIAGNOSIS — E78.00 PURE HYPERCHOLESTEROLEMIA, UNSPECIFIED: ICD-10-CM

## 2022-03-14 PROCEDURE — 99214 OFFICE O/P EST MOD 30 MIN: CPT

## 2022-03-14 NOTE — HISTORY OF PRESENT ILLNESS
[FreeTextEntry1] : 66-year-old man with a history of hypertension and type 2 diabetes for many years, who developed what appears to be diabetic nephropathy -he has CKD 4 with nearly nephrotic range proteinuria of almost 2 g.  His creatinine is slowly rising and has now drifted from 2.7 up to 3.0 with a BUN of 48, and GFR of 21.  His K is 4.4 with a CO2 of 24.  Urine microalbumin is 1722.  His blood sugar control has worsened in the last few months with his A1c rising from 7.8 up to 8.5 despite Farxiga.  He has been sedentary and has been admittedly poor with his diet.  His PTH is 146 with a calcium of 9 and phosphorus of 3.1.  His PCP is Dr. Winston Leone.

## 2022-03-14 NOTE — ASSESSMENT
[FreeTextEntry1] : 66-year-old man with well-controlled hypertension on amlodipine 10 mg daily and losartan 100 mg daily.  Despite Farxiga, his blood sugar control is suboptimal mainly due to poor health habits, which he promises to improve.  I urged him to introduce more aerobic exercise even if just brisk walking.  His renal function is deteriorating and he still has very significant proteinuria.  I would like to have started him on Kerendia, but his GFR of 21 currently is too low.  His K is not an issue.  We may need to start calcitriol in the next few months.  I will recheck his labs in 3 months, to include BMP, PTH, phosphorus, A1c, H&H followed by a visit.

## 2022-03-14 NOTE — CONSULT LETTER
[Dear  ___] : Dear  [unfilled], [Consult Letter:] : I had the pleasure of evaluating your patient, [unfilled]. [Please see my note below.] : Please see my note below. [Consult Closing:] : Thank you very much for allowing me to participate in the care of this patient.  If you have any questions, please do not hesitate to contact me. [Sincerely,] : Sincerely, [FreeTextEntry2] : Dr carrie kendrick [FreeTextEntry3] : Sincerely, \par \par Phillip Ruiz MD, FACP

## 2022-06-02 ENCOUNTER — APPOINTMENT (OUTPATIENT)
Dept: NEPHROLOGY | Facility: CLINIC | Age: 67
End: 2022-06-02
Payer: COMMERCIAL

## 2022-06-02 VITALS
WEIGHT: 230 LBS | SYSTOLIC BLOOD PRESSURE: 122 MMHG | DIASTOLIC BLOOD PRESSURE: 71 MMHG | BODY MASS INDEX: 32.93 KG/M2 | HEART RATE: 80 BPM | HEIGHT: 70 IN

## 2022-06-02 DIAGNOSIS — K76.0 FATTY (CHANGE OF) LIVER, NOT ELSEWHERE CLASSIFIED: ICD-10-CM

## 2022-06-02 DIAGNOSIS — Z78.9 OTHER SPECIFIED HEALTH STATUS: ICD-10-CM

## 2022-06-02 PROCEDURE — 99214 OFFICE O/P EST MOD 30 MIN: CPT

## 2022-06-02 NOTE — ASSESSMENT
[FreeTextEntry1] : 66-year-old man with well-controlled hypertension, but poorly controlled diabetes -he has considerable cardiac risk factors including fatty liver and hyperlipidemia.  I added Vascepa to his statin, but his triglycerides are still 383.  His compliance with that may be faulty.  I again admonished him about the need for better lifestyle habits and he agrees to try.  He will return in 3 months and I have ordered labs to be done including BMP, PTH, A1c, U ACR, Cystatin C.

## 2022-06-02 NOTE — HISTORY OF PRESENT ILLNESS
[FreeTextEntry1] : 66-year-old man with a history of hypertension and type 2 diabetes for many years, who has developed CKD 4 with near nephrotic range proteinuria probably due to diabetic nephropathy.  His creatinine is stable in the 3 range currently 2.95 with a BUN of 56, GFR of 21, K of 4.2, CO2 28, U ACR has dropped to 1420 on Farxiga and high-dose ARB.  However, his diabetes remains very poorly controlled with an A1c of 9.8.  He cannot seem to make headway with lifestyle changes.  He remains sedentary, drinking wine regularly, and not watching calorie intake.  We have again discussed the need for those.  His current renal function makes it impossible to use metformin, and we cannot increase his Farxiga.

## 2022-06-02 NOTE — CONSULT LETTER
[Dear  ___] : Dear  [unfilled], [Consult Letter:] : I had the pleasure of evaluating your patient, [unfilled]. [Please see my note below.] : Please see my note below. [Consult Closing:] : Thank you very much for allowing me to participate in the care of this patient.  If you have any questions, please do not hesitate to contact me. [Sincerely,] : Sincerely, [FreeTextEntry2] : Dr Winston Leone [FreeTextEntry3] : Sincerely, \par \par Phillip Ruiz MD, FACP\par

## 2022-08-08 ENCOUNTER — RX RENEWAL (OUTPATIENT)
Age: 67
End: 2022-08-08

## 2022-10-24 ENCOUNTER — APPOINTMENT (OUTPATIENT)
Dept: NEPHROLOGY | Facility: CLINIC | Age: 67
End: 2022-10-24

## 2022-10-24 VITALS
HEIGHT: 70 IN | SYSTOLIC BLOOD PRESSURE: 108 MMHG | BODY MASS INDEX: 32.93 KG/M2 | HEART RATE: 76 BPM | WEIGHT: 230 LBS | DIASTOLIC BLOOD PRESSURE: 72 MMHG

## 2022-10-24 DIAGNOSIS — E79.0 HYPERURICEMIA W/OUT SIGNS OF INFLAMMATORY ARTHRITIS AND TOPHACEOUS DISEASE: ICD-10-CM

## 2022-10-24 PROCEDURE — 99214 OFFICE O/P EST MOD 30 MIN: CPT

## 2022-10-24 NOTE — HISTORY OF PRESENT ILLNESS
[FreeTextEntry1] : 67-year-old man with uncontrolled type 2 diabetes, well-controlled hypertension, and stable CKD 4 -his creatinine remains in the 3.0 range, with a BUN of 62, GFR of 22, A1c 10.2, nearly 1.8 g of protein despite optimal dose ARB.  Unfortunately, his GFR is too low to add Kerendia, he is already on Farxiga.  His PTH is up to 171 with a calcium of 9.2, phosphorus 4.1, GFR of 17 as determined by Cystatin C.  His home BP is consistently normal.  He denies any loss of appetite, nausea, vomiting, pruritus.

## 2022-10-24 NOTE — CONSULT LETTER
[Dear  ___] : Dear  [unfilled], [Consult Letter:] : I had the pleasure of evaluating your patient, [unfilled]. [Please see my note below.] : Please see my note below. [Consult Closing:] : Thank you very much for allowing me to participate in the care of this patient.  If you have any questions, please do not hesitate to contact me. [Sincerely,] : Sincerely, [FreeTextEntry2] : Dr. Winston Monroy [FreeTextEntry3] : Sincerely, \par \par Phillip Ruiz MD, FACP\par

## 2022-10-24 NOTE — PHYSICAL EXAM
[General Appearance - Alert] : alert [General Appearance - In No Acute Distress] : in no acute distress [Sclera] : the sclera and conjunctiva were normal [PERRL With Normal Accommodation] : pupils were equal in size, round, and reactive to light [Outer Ear] : the ears and nose were normal in appearance [Neck Appearance] : the appearance of the neck was normal [Neck Cervical Mass (___cm)] : no neck mass was observed [Jugular Venous Distention Increased] : there was no jugular-venous distention [Skin Color & Pigmentation] : normal skin color and pigmentation [Skin Turgor] : normal skin turgor [Deep Tendon Reflexes (DTR)] : deep tendon reflexes were 2+ and symmetric [] : no rash [Sensation] : the sensory exam was normal to light touch and pinprick [No Focal Deficits] : no focal deficits [Oriented To Time, Place, And Person] : oriented to person, place, and time [Impaired Insight] : insight and judgment were intact [Affect] : the affect was normal

## 2022-10-24 NOTE — ASSESSMENT
[FreeTextEntry1] : 67-year-old man with superb blood pressure control, but suboptimal glucose control, and continued albuminuria with stable creatinine/GFR.  I have ordered labs to be repeated in 3 to 4 months, to include U ACR, CMP, CBC, phosphorus, PTH, A1c.  I am adding calcitriol 0.25 mcg daily in an effort to reduce his secondary hyperparathyroidism.  He will return in 3 to 4 months following the next set of labs.

## 2022-11-02 ENCOUNTER — RX RENEWAL (OUTPATIENT)
Age: 67
End: 2022-11-02

## 2022-11-03 ENCOUNTER — TRANSCRIPTION ENCOUNTER (OUTPATIENT)
Age: 67
End: 2022-11-03

## 2023-01-06 ENCOUNTER — TRANSCRIPTION ENCOUNTER (OUTPATIENT)
Age: 68
End: 2023-01-06

## 2023-01-17 ENCOUNTER — APPOINTMENT (OUTPATIENT)
Dept: NEPHROLOGY | Facility: CLINIC | Age: 68
End: 2023-01-17
Payer: MEDICARE

## 2023-01-17 VITALS
SYSTOLIC BLOOD PRESSURE: 125 MMHG | WEIGHT: 230 LBS | DIASTOLIC BLOOD PRESSURE: 76 MMHG | HEIGHT: 70 IN | BODY MASS INDEX: 32.93 KG/M2

## 2023-01-17 PROCEDURE — 99443: CPT | Mod: 95

## 2023-01-17 RX ORDER — AMLODIPINE BESYLATE 10 MG/1
10 TABLET ORAL
Qty: 90 | Refills: 1 | Status: ACTIVE | COMMUNITY
Start: 2017-08-14

## 2023-01-17 NOTE — HISTORY OF PRESENT ILLNESS
[Home] : at home, [unfilled] , at the time of the visit. [Medical Office: ([facility])___] : at the medical office located in  [Verbal consent obtained from patient] : the patient, [unfilled] [FreeTextEntry1] : Discussed with patient : You have chosen to receive care through the use of tele-media.  It enables healthcare providers at different locations to provide safe, effective, and convenient care through the use of technology.  Please note this is a billable encounter.  As with any healthcare service, there are risks associated with the use of tele-media, including  issues.  You understand that I cannot physically examine you and that you may need to come to the office to complete the assessment.   Patient agreed verbally and understands the risks and benefits of tele-media as explained.  All questions regarding tele-media encounters were answered.\par                                                                                                                                                                                                                 67-year-old man with uncontrolled type 2 diabetes, well-controlled hypertension, stable CKD 4.  Proteinuria has fallen from about 1.8 g down to 1.37.  However his A1c is 10.6 with an FBS of 234.  Creatinine is 3.24 up slightly from 3.04 in October, with a BUN of 58 and a GFR of 20.  His K is 4.1 with a CO2 of 25.  PTH is 130, calcium 9.5, phosphorus 4.2, hemoglobin is 14.2.  His appetite is okay, with no nausea, vomiting, itching.  He is sleeping well.  His blood sugar continues to be very high in spite of multiple meds given by Dr. Leone.  His daughter asked about the possibility of a GLP-1 agonist such as semaglutide.  They will discuss that with Dr. RUBIN he asked me to renew his amlodipine, Farxiga, Vascepa, losartan, simvastatin, and sodium bicarbonate.  Home BP is consistently good always under 130 systolic.

## 2023-01-17 NOTE — ASSESSMENT
[FreeTextEntry1] : 67-year-old man with well-controlled hypertension, uncontrolled type 2 diabetes despite multiple meds -CKD 4 due to diabetic nephropathy with nonnephrotic range proteinuria.  His GFR is too low to start Kerendia, and he is already on ARB and SGLT2 inhibitor.  In view of his PTH of 130, I am starting calcitriol 0.25 mcg daily.  I have renewed all of the requested meds.  I have ordered labs to be repeated in 2 months, just prior to his next visit.  He will follow-up with  regarding his blood sugar.  Time spent 22 minutes

## 2023-04-23 ENCOUNTER — TRANSCRIPTION ENCOUNTER (OUTPATIENT)
Age: 68
End: 2023-04-23

## 2023-05-02 ENCOUNTER — APPOINTMENT (OUTPATIENT)
Dept: NEPHROLOGY | Facility: CLINIC | Age: 68
End: 2023-05-02
Payer: MEDICARE

## 2023-05-02 VITALS
HEART RATE: 80 BPM | WEIGHT: 230 LBS | DIASTOLIC BLOOD PRESSURE: 71 MMHG | SYSTOLIC BLOOD PRESSURE: 132 MMHG | BODY MASS INDEX: 32.93 KG/M2 | HEIGHT: 70 IN

## 2023-05-02 DIAGNOSIS — E11.65 TYPE 2 DIABETES MELLITUS WITH HYPERGLYCEMIA: ICD-10-CM

## 2023-05-02 PROCEDURE — 99214 OFFICE O/P EST MOD 30 MIN: CPT

## 2023-05-02 NOTE — ASSESSMENT
[FreeTextEntry1] : 67-year-old man with uncontrolled type 2 diabetes, controlled BP, stable CKD stage IV, no anemia and no uremic symptoms or signs.  I have increased his Farxiga to 10 mg daily in the hope of providing better diabetes control as well as increased cardiorenal protection.  I have renewed his Vascepa.  He will remain on sodium bicarbonates, amlodipine, simvastatin, etc.  I have ordered labs to be repeated in 5 months to include A1c, BMP, Cystatin C, PTH, H&H.

## 2023-05-02 NOTE — PHYSICAL EXAM
[facility]  Adult Nutrition  Consult Note    SUMMARY     Recommendations  Recommendation/Intervention:   1. Encourage increased PO intake.   2. If PO intake remains poor, recommend adding Boost Plus OS to all meals to increase calorie and protein intake.   RD to monitor.     Goals: Patient will consume > 75% of meals  Nutrition Goal Status: new  Communication of RD Recs: reviewed with RN    Reason for Assessment  Reason for Assessment: physician consult  Diagnosis:  (NSTEMI)  Relevent Medical History: HTN   Interdisciplinary Rounds: did not attend   General Information Comments: POD#1 s/p CABGx3. Patient asleep at time of visit. Patient advanced to Cardiac diet with 1500 mL FR this AM. Patient's nurse reports patient only ate a little bit this AM. Nutrition D/C Planning: adequate nutrition via PO diet.     Nutrition Prescription Ordered  Current Diet Order: Cardiac  Nutrition Order Comments: 1500 mL FR       Nutrition Risk Screen   Nutrition Risk Screen: no indicators present    Nutrition/Diet History  Patient Reported Diet/Restrictions/Preferences:  (MARY)  Typical Food/Fluid Intake: MARY  Food Preferences: MARY   Factors Affecting Nutritional Intake: decreased appetite     Labs/Tests/Procedures/Meds   Pertinent Labs Reviewed: reviewed  Pertinent Labs Comments: Glu 151, Ca 8.6  Pertinent Medications Reviewed: reviewed  Pertinent Medications Comments: docusate, furosemide, Pantoprazole, IVF, insulin drip, cardene    Physical Findings  Overall Physical Appearance: overweight, on oxygen therapy   Oral/Mouth Cavity: WDL  Skin: edema, other (see comments) (incisions)    Anthropometrics   Height (inches): 64.02 in  Weight Method: Stated  Weight (kg): 105.2 kg   Ideal Body Weight (IBW), Female: 120.1 lb   % Ideal Body Weight, Female (lb): 193.11 lb  BMI (kg/m2): 39.79  BMI Grade: 35 - 39.9 - obesity - grade II  Usual Body Weight (UBW), kg:  (MARY)     Estimated/Assessed Needs  Weight Used For Calorie  Calculations: 105.2 kg (231 lb 14.8 oz)   Height (cm): 162.6 cm   Energy Need Method: [place]-St Jeor (2075 kcal/day (1.25))   RMR ([place]-St. Jeor Equation): 1660.2   Weight Used For Protein Calculations: 105.2 kg (231 lb 14.8 oz)  Protein Requirements: 127-147 g/day (1.2-1.4 g/kg)  Fluid Need Method:  (per MD)     Monitor and Evaluation  Food and Nutrient Intake: energy intake, food and beverage intake  Food and Nutrient Adminstration: diet order   Physical Activity and Function: nutrition-related ADLs and IADLs  Anthropometric Measurements: weight change  Biochemical Data, Medical Tests and Procedures: electrolyte and renal panel, gastrointestinal profile  Nutrition-Focused Physical Findings: overall appearance    Nutrition Risk  Level of Risk: high    Nutrition Follow-Up  RD Follow-up?: Yes    Nutrition Diagnosis  Problem: Increased nutrient needs  Etiology: increased need for protein  Signs/Symptoms: s/p CABGx3  Nutrition Diagnosis Status: New     [General Appearance - Alert] : alert [General Appearance - In No Acute Distress] : in no acute distress [Sclera] : the sclera and conjunctiva were normal [PERRL With Normal Accommodation] : pupils were equal in size, round, and reactive to light [Outer Ear] : the ears and nose were normal in appearance [Neck Appearance] : the appearance of the neck was normal [Neck Cervical Mass (___cm)] : no neck mass was observed [Jugular Venous Distention Increased] : there was no jugular-venous distention [Skin Color & Pigmentation] : normal skin color and pigmentation [Skin Turgor] : normal skin turgor [] : no rash [Deep Tendon Reflexes (DTR)] : deep tendon reflexes were 2+ and symmetric [Sensation] : the sensory exam was normal to light touch and pinprick [No Focal Deficits] : no focal deficits [Oriented To Time, Place, And Person] : oriented to person, place, and time [Impaired Insight] : insight and judgment were intact [Affect] : the affect was normal

## 2023-05-02 NOTE — HISTORY OF PRESENT ILLNESS
[FreeTextEntry1] : 67-year-old man with uncontrolled type 2 diabetes, controlled hypertension, stable CKD 4, nonnephrotic proteinuria, with nearly 2 g of protein based on his latest U ACR of 1996.  Creatinine is 3.4 with a BUN of 71, GFR 18-19, hemoglobin 13.9, A1c 9.6, PTH has fallen from 130 down to 67 on calcitriol, K3.9, albumin 4.1.  He is going to [facility] from May through September.  I am renewing his fish oil and I am asking him to increase Farxiga to 10 mg daily both to control sugar better and to provide more antiproteinuric and nephro protective effect.  He could probably benefit from something like a GLP-1 agonist.  Home BP is always under 130.  His appetite is good and he has no pruritus.  His GFR is too low to add Kerendia.

## 2023-05-04 ENCOUNTER — TRANSCRIPTION ENCOUNTER (OUTPATIENT)
Age: 68
End: 2023-05-04

## 2023-05-04 RX ORDER — DAPAGLIFLOZIN 10 MG/1
10 TABLET, FILM COATED ORAL
Qty: 90 | Refills: 1 | Status: ACTIVE | COMMUNITY
Start: 2023-05-04 | End: 1900-01-01

## 2023-08-25 NOTE — ED ADULT TRIAGE NOTE - CHIEF COMPLAINT QUOTE
ventral hernia Banner Transposition Flap Text: Because of orientation and full-thickness nature of the defect, a long rhombic transposition flap (banner flap) was planned. After prep and anesthesia, the rhombic flap was carefully incised to straddle relaxed skin tension lines and the anticipated Burow's triangle removed. The flap was raised and the wound edges were all undermined in the subcutaneous plane. After hemostasis, the flap was transposed/carried over into the defect and sutured in a layered fashion.

## 2023-09-19 NOTE — ED PROVIDER NOTE - CROS ED CARDIOVAS ALL NEG
ANN-MARIE ambulatory encounter  ENDOCRINOLOGY DIABETES Follow up      CHIEF COMPLAINT:    Chief Complaint   Patient presents with    Other     Follow-up       PRIMARY CARE PHYSICIAN:  Cash Ferrara MD    SUBJECTIVE:  Mark Rm is a 69 year old male who is here for evaluation of Diabetes Mellitus - type I/LEESA.  Patient also has insulin resistance with TDD insulin >60 units.  The patient's disease has remained uncontrolled, though with significant improvement since starting hybrid closed loop insulin pump just under 6 mos ago.     Has trialed GLP1 analog in the past (daily victoza) but has held for now.  Would be interested in re-trialing if insurance will cover.    The patient's current diabetic regimen:  Metformin 1000 mg po bid (due to weight and presumed insulin resistance)   Humalog via Tandem with Control-IQ insulin pump    Current pump settings :  Time Basal ICR ICF Target    12 AM 1.25 1:6 30 110   3 am 1.0         6 am 1.5  5        8pm 1.0 8 50         Of note during CT coronary calcium scoring a 5cm aneurysm of the ascending aorta was noted.   He follows with Cardiology    Duration of disease: Since 2011  His disease is complicated by Hyperlipidemia, Obesity .  Microvascular complications include None. Macrovascular complications include none.     History of hospitalizations with diabetic complications or DKA - No.     Patient initiated on DEXCOM sensor in Nov 2016.      ROS / Interval History:   Significant improvement in A1c since starting insulin pump.  No significant reported issues with pump, infusion sites, sensors.  Reports slow steady weight gain over past few years.  Would be interested in re-trialing a GLP medication if possible.  No new / significant changes to overall health reported.    Procedure: CGM Interpretation  Device Name:  Dexcom G6    Dates of data reviewed:   9/5 - 9/18/2023  Percent time CGM is active:  90 %     Summary of glucose data:              GMI:  N/A               Average glucose:  178              Time in range:      Above target (>180):  40 %  Target range ():  59 %  Below target (<70):  1 %     Analysis/interpretation of glucose data:  1.  Overnight / early morning near to overt hypoglycemia  2.  Significant post-supper hyperglycemia  3.  Some days very stable and nearly entirely at goal!!      Avg TDD: 62.15 units;  Basal: 55%; Food Bolus: 29%; Correction Bolus: 4%; Auto Bolus: 12%  Auto mode: 90%      DIABETIC HEALTH MAINTENANCE  Date of last Ophthalmology exam: August 2022  Sees dentist regularly.  Is patient on an ACE/ARB? yes  Is patient on ASA therapy? Yes  Is patient on Statin/Fibrate therapy? Yes  Has patient had had foot care education? Yes     Does the patient have a Glucagon kit or other methods of treating hypoglycemia? Yes  Does the patient have a Medical ID? Yes. He has been informed of its benefit.    MEDICATIONS/ALLERGIES:  Current Outpatient Medications   Medication Sig Dispense Refill    tirzepatide (MOUNJARO) 2.5 MG/0.5ML Solution Pen-injector Inject 2.5 mg into the skin every 7 days. 2 mL 1    [START ON 10/23/2023] tirzepatide (MOUNJARO) 5 MG/0.5ML Solution Pen-injector Inject 5 mg into the skin every 7 days. Do not start before October 23, 2023. 2 mL 3    B-D U/F PEN NEEDLE 5/16\" 31G X 8 MM Misc USE TO INJECT 6 TIMES DAILY AS DIRECTED 200 each 6    metformin (GLUCOPHAGE) 1000 MG tablet TAKE 1 TABLET BY MOUTH TWICE DAILY 180 tablet 2    insulin glargine (Lantus SoloStar) 100 UNIT/ML pen-injector Inject 30 Units into the skin as needed (for emergency pump failure). Prime 2 units before each dose. 3 mL 1    insulin lispro (HumaLOG) 100 UNIT/ML injectable solution To be used  In Tandem T slim control IQ insulin pump max daily dose is 100 units daily. 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 Correction: Blood glucose > 200 +2 units, blood sugar > 300 +4 units. 60 mL 3    atorvastatin (LIPITOR) 40 MG tablet Take 1 tablet by mouth daily. 90 tablet 3    losartan (COZAAR) 100 MG tablet Take 1 tablet by mouth daily. 90 tablet 3    Glucagon (Baqsimi Two Pack) 3 MG/DOSE Powder Call 911.  Place 1 spray in 1 nostril.  If no response in 15 minutes, place 1 more spray in nostril with new device. 1 each 11    Continuous Blood Gluc  (Dexcom G6 ) Device 1 each daily. Indications: Insulin-Dependent Diabetes Use as directed 1 Device 0    Continuous Blood Gluc Sensor (Dexcom G6 Sensor) Misc Insert new sensor every 10 days.  Use as directed. Change sensor every 10 days. 3 each 13    Continuous Blood Gluc Transmit (Dexcom G6 Transmitter) Misc 1 each daily. Indications: Insulin-Dependent Diabetes As directed. 1 each 3    blood glucose test strip Test blood sugar 4 times daily as directed. Diagnosis: E10.8 Meter: Contour Next One 200 each 5    Multiple Vitamin (MULTI-VITAMIN PO) Take by mouth daily.      Menaquinone-7 (VITAMIN K2 PO) Take 100 mcg by mouth daily.      blood glucose lancets Test blood sugar 6 times daily as directed. Diagnosis: E10.8. Meter: Please provide Delica Plus Lancets 600 each 3    Lancets (ONETOUCH ULTRASOFT) Misc Checking blood sugars 2 times a day 200 each 4    aspirin 81 MG chewable tablet 1 TABLET DAILY       No current facility-administered medications for this encounter.     Allergies as of 09/19/2023 - Reviewed 09/19/2023   Allergen Reaction Noted    Lisinopril Cough 06/11/2014       REVIEW OF SYSTEMS:  All other systems are reviewed and are negative except as documented in the history of present illness.    OBJECTIVE:  PAST HISTORIES:  I have reviewed the patient's past medical, family and social history, there are no changes.    PHYSICAL EXAM:  VITAL SIGNS:    Visit Vitals  /70 (BP Location: RUE - Right upper  extremity, Patient Position: Sitting)   Pulse 65   Resp 16   Ht 6' 2\" (1.88 m)   Wt 127.5 kg (281 lb)   BMI 36.08 kg/m²    Body mass index is 36.08 kg/m².    WEIGHTS:    Wt Readings from Last 4 Encounters:   09/19/23 127.5 kg (281 lb)   05/30/23 128.8 kg (283 lb 14.4 oz)   05/16/23 125.3 kg (276 lb 3.2 oz)   04/12/23 128.1 kg (282 lb 4.8 oz)     GENERAL:  Oriented x3.  Appears well-developed and well-nourished.  In no acute distress.   LUNGS:  Non-labored respirations.  Clear to auscultation bilaterally.  CARDIOVASCULAR:  S1, S2, regular rate and rhythm, no murmurs.   EXTREMITIES:  Normal range of motion x4.  No edema or tenderness.  SKIN: No rashes or lesions seen on exposed skin; no lipohypertrophy noted to pump / sensor sites  NEUROLOGIC:  No tremors or motor deficits.  Stable gait.  PSYCHIATRIC:  Normal mood and affect.    LAB RESULTS:  Glucose (mg/dL)   Date Value   09/12/2023 162 (H)     Hemoglobin A1C (%)   Date Value   09/12/2023 7.5 (H)     LDL (mg/dL)   Date Value   09/12/2023 61       HDL (mg/dL)   Date Value   09/12/2023 83       Triglycerides (mg/dL)   Date Value   09/12/2023 70       Cholesterol (mg/dL)   Date Value   09/12/2023 158     Creatinine (mg/dL)   Date Value   09/12/2023 0.99      HGB (g/dL)   Date Value   04/06/2023 13.6      HCT (%)   Date Value   04/06/2023 40.3       GPT/ALT (Units/L)   Date Value   09/12/2023 27       GOT/AST (Units/L)   Date Value   09/12/2023 15      Microalbumin/ Creatinine Ratio (mg/g)   Date Value   04/06/2023 8.8       TSH (mcUnits/mL)   Date Value   09/12/2023 2.207       Vitamin D, 25-Hydroxy (ng/mL)   Date Value   04/06/2023 16.2 (L)       ASSESSMENT:  1. 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 Diabetes mellitus with features of insulin resistance (CMD)    3. Insulin pump titration        PLAN:  Dm Type 1/LEESA  On hybrid closed loop insulin pump   Pump changes as below (bolded):  Time Basal ICR ICF Target    12 AM 1.25--> 1.0  1:6 30 110   3 am 1.0--> 0.8         6 am 1.5  5        8pm 1.0--> 1.4 8 50       Continue Control IQ     Continue metformin BID  START Mounjaro 2.5mg once weekly x 4 weeks (+ 1 refill) then increase to 5mg once weekly    If any hypoglycemia after starting Mounjaro, anticipate changes to ICR will be needed; would initially recommend weakening ICR (across all 24 hrs) to 1:8    Emergency supplies:  Has Lantus emergency pen (30 units of basal)  Has glucagon  Has medical ID  Spouse aware of supplies and how to treat     HTN: on ARB, BP stable. On baby ASA     Health maintenance:  all labs reviewed with patient     Hyperlipidemia: On 40mg of atorvastatin  LDL goal of <70 (at goal 9/2023)     He has bilateral hearing aids     Return to clinic:  December 2023 with MAXIMILIAN Loving or Frankie    Care discussed with and medical decision making per Dr. Alvin Loving, MSN, APNP, FNP-BC  Endocrinology [facility]    ENDOCRINE MD  I have seen and examined the patient and conducted my own physical exam below.  >50% of the patient care time, including face to face contact and coordination of care-was spent by myself as the primary provider.  100% of the Medical Decision Making for this patient was done by myself.  I have fully reviewed the History and Physical Exam and agree with the full note above by MARY ALICE Loving NP.  GENERAL:  Well-nourished, well-hydrated adult in NAD.  SKIN:  Warm and dry without rash.  Normal texture and temperature.  LUNGS:  Non-labored respirations.    CARDIOVASCULAR:   Normal upper ext pulses.  ABDOMEN:  Soft and nontender. Insertion sites doing well  EXTREMITIES: No edema, tenderness, clubbing or cyanosis. GEIGER x 4  NEURO: No motor deficits.  Stable gait.   PSYCHIATRIC: Alert and oriented x 4.  Normal mood and affect.    Assessment and Plan:  Type 1 DM on insulin pump# overall doing well, changes above to avoid hypoglycemia overnight and allow for improved coverage post dinner. Obesity/insulin resistance#  will trial GLP1 analog and reviewed side effects, risks and benefits with current medication regimen. Will make changes to carb ratio as above if this gets approved.  Reviewed Health maintenance and labs. Will see him again in 3 months    Please call/contact us with any questions or concerns,  Alvin David MD       negative...

## 2023-11-01 ENCOUNTER — APPOINTMENT (OUTPATIENT)
Dept: NEPHROLOGY | Facility: CLINIC | Age: 68
End: 2023-11-01
Payer: MEDICARE

## 2023-11-01 VITALS
BODY MASS INDEX: 33.64 KG/M2 | DIASTOLIC BLOOD PRESSURE: 68 MMHG | HEIGHT: 70 IN | WEIGHT: 235 LBS | SYSTOLIC BLOOD PRESSURE: 112 MMHG | HEART RATE: 84 BPM

## 2023-11-01 DIAGNOSIS — N18.4 CHRONIC KIDNEY DISEASE, STAGE 4 (SEVERE): ICD-10-CM

## 2023-11-01 PROCEDURE — 99214 OFFICE O/P EST MOD 30 MIN: CPT

## 2023-11-05 ENCOUNTER — TRANSCRIPTION ENCOUNTER (OUTPATIENT)
Age: 68
End: 2023-11-05

## 2023-12-19 ENCOUNTER — APPOINTMENT (OUTPATIENT)
Dept: NEPHROLOGY | Facility: CLINIC | Age: 68
End: 2023-12-19
Payer: MEDICARE

## 2023-12-19 VITALS — HEIGHT: 70 IN | WEIGHT: 235 LBS | BODY MASS INDEX: 33.64 KG/M2

## 2023-12-19 PROCEDURE — 99443: CPT | Mod: 95

## 2023-12-19 NOTE — ASSESSMENT
[FreeTextEntry1] : 68-year-old man who now has stage V CKD but no uremic signs or symptoms.  I am reluctant to totally stop his amlodipine but we have agreed to make it every other day for now.  He checks his BP at home and he will alert us if it becomes consistently above 135 systolic.  He is having blood drawn tomorrow.  I anticipate we will need to discuss creation of an AV fistula.  Time spent 11 minutes

## 2023-12-19 NOTE — HISTORY OF PRESENT ILLNESS
[Home] : at home, [unfilled] , at the time of the visit. [Medical Office: ([facility])___] : at the medical office located in  [Verbal consent obtained from patient] : the patient, [unfilled] [FreeTextEntry1] : Discussed with patient : You have chosen to receive care through the use of tele-media.  It enables healthcare providers at different locations to provide safe, effective, and convenient care through the use of technology.  Please note this is a billable encounter.  As with any healthcare service, there are risks associated with the use of tele-media, including  issues.  You understand that I cannot physically examine you and that you may need to come to the office to complete the assessment.   Patient agreed verbally and understands the risks and benefits of tele-media as explained.  All questions regarding tele-media encounters were answered.                                     69-year-old man with a history of poorly controlled type 2 diabetes, well-controlled hypertension, previously stable CKD which has deteriorated in the last year.  Creatinine is rising now in the 5 range, with a GFR down to 13, and still with more than a gram of protein.  His A1c is above 10, and he has secondary hyperparathyroidism.  His diet has been chronically poor.  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  However I explained to Steven that he is rapidly progressing towards ESRD and dialysis and tight blood pressure control is critical and slowing that process.  In a 68-year-old man, it may be difficult to control his BP adequately without a CCB.  I suggested we first start by making amlodipine every other day since it has a long half-life approaching 45 hours.  He agrees.  I will text that to his dentist. Dr Jim Shukla,  cell [phone].

## 2023-12-19 NOTE — CONSULT LETTER
[Dear  ___] : Dear  [unfilled], [Courtesy Letter:] : I had the pleasure of seeing your patient, [unfilled], in my office today. [FreeTextEntry2] : Dr Jim Nuñez [FreeTextEntry1] : Abram Fernandez -thanks very much.  You are absolutely right about amlodipine causing gingival hyperplasia but I am a little bit trapped because he is on the verge of end-stage renal disease and dialysis dependency.  In his situation, it will be very difficult to control his BP and slow the progression of renal disease without a CCB.  I have asked him to reduce to every other day because amlodipine has a half-life of about 45 hours.  But I also realize that not can illuminate the gingival hyperplasia.  I will try to eliminate it if his BP remains adequate.  Your efforts are much appreciated, happy holidays, Lincoln Ruiz

## 2023-12-20 ENCOUNTER — APPOINTMENT (OUTPATIENT)
Dept: ENDOCRINOLOGY | Facility: CLINIC | Age: 68
End: 2023-12-20

## 2024-01-01 NOTE — ED PROVIDER NOTE - EYES, MLM
Patient transferred to room 3032 via wheelchair, baby in mom's arms. Bands read and verified, report given to Wandy GARCIA. Bedside safety check and safety time out performed. Pediatric Safety Transmitter #: [id number]. Infant fall prevention and safe sleep practices reviewed with Learner: mom and guardian .     Clear bilaterally

## 2024-01-03 ENCOUNTER — APPOINTMENT (OUTPATIENT)
Dept: NEPHROLOGY | Facility: CLINIC | Age: 69
End: 2024-01-03
Payer: MEDICARE

## 2024-01-03 VITALS — WEIGHT: 220 LBS | HEIGHT: 70 IN | BODY MASS INDEX: 31.5 KG/M2

## 2024-01-03 PROCEDURE — 99443: CPT | Mod: 93

## 2024-01-03 NOTE — HISTORY OF PRESENT ILLNESS
[Home] : at home, [unfilled] , at the time of the visit. [Medical Office: ([facility])___] : at the medical office located in  [Verbal consent obtained from patient] : the patient, [unfilled] [FreeTextEntry1] : Discussed with patient : You have chosen to receive care through the use of tele-media.  It enables healthcare providers at different locations to provide safe, effective, and convenient care through the use of technology.  Please note this is a billable encounter.  As with any healthcare service, there are risks associated with the use of tele-media, including  issues.  You understand that I cannot physically examine you and that you may need to come to the office to complete the assessment.   Patient agreed verbally and understands the risks and benefits of tele-media as explained.  All questions regarding tele-media encounters were answered.                             68-year-old male with a history of poorly controlled type 2 diabetes for years, well-controlled hypertension, and previously stable CKD which deteriorated significantly in the last year.  His creatinine is now up from 4.82 in October to 5.47, BUN up from , and GFR down to 11-12.  His CO2 is 20, PTH 43, A1c 7.2, K4.2.  He has deliberately lost 18 pounds which she insists is not because of loss of appetite but deliberate efforts.  That seems plausible because his A1c was 10 only 3 months ago.  We reduced his amlodipine dose because of gingival hyperplasia and I was in communication with his dentist.  He has no nausea or vomiting, he actually has more stamina and is walking better with the weight loss.  However I explained that he is now getting dangerously close to the level where he may need hemodialysis.  I strongly urged him to make an appointment with our vascular surgery team for creation of an AV fistula in his left arm.  I asked him to have blood drawn only from the right arm.  He has an appointment scheduled for February 1, 4 weeks from now.

## 2024-01-03 NOTE — ASSESSMENT
[FreeTextEntry1] : 68-year-old man with progressive deterioration of CKD despite good BP control, use of SGLT2 inhibitor and ARB and optimal doses.  He is not clinically uremic in any fashion, but is probably approaching the need for dialysis.  I have given him information on how to make an appointment with our vascular surgery team.  I have told him to call me if he has any new symptoms between now and February 1.  In person, I will also urged him to consider being listed for transplant at [place] or [place], or [place].  I have ordered labs to be repeated prior to that next visit on February 1, to include BMP, Cystatin C, H&H, PTH.  Time spent 21 minutes

## 2024-01-10 ENCOUNTER — APPOINTMENT (OUTPATIENT)
Dept: ENDOCRINOLOGY | Facility: CLINIC | Age: 69
End: 2024-01-10

## 2024-01-29 ENCOUNTER — TRANSCRIPTION ENCOUNTER (OUTPATIENT)
Age: 69
End: 2024-01-29

## 2024-01-30 ENCOUNTER — APPOINTMENT (OUTPATIENT)
Dept: VASCULAR SURGERY | Facility: CLINIC | Age: 69
End: 2024-01-30
Payer: MEDICARE

## 2024-01-30 VITALS
HEIGHT: 69 IN | BODY MASS INDEX: 29.03 KG/M2 | SYSTOLIC BLOOD PRESSURE: 142 MMHG | WEIGHT: 196 LBS | DIASTOLIC BLOOD PRESSURE: 62 MMHG | HEART RATE: 76 BPM

## 2024-01-30 DIAGNOSIS — Z87.891 PERSONAL HISTORY OF NICOTINE DEPENDENCE: ICD-10-CM

## 2024-01-30 PROCEDURE — 93986 DUP-SCAN HEMO COMPL UNI STD: CPT

## 2024-01-30 PROCEDURE — 99204 OFFICE O/P NEW MOD 45 MIN: CPT

## 2024-01-30 RX ORDER — DAPAGLIFLOZIN 5 MG/1
5 TABLET, FILM COATED ORAL
Qty: 90 | Refills: 3 | Status: DISCONTINUED | COMMUNITY
Start: 2023-01-17 | End: 2024-01-30

## 2024-01-30 RX ORDER — DULAGLUTIDE 1.5 MG/.5ML
1.5 INJECTION, SOLUTION SUBCUTANEOUS
Refills: 0 | Status: ACTIVE | COMMUNITY

## 2024-01-31 NOTE — HISTORY OF PRESENT ILLNESS
[FreeTextEntry1] : 68yoM w/PMHx of poorly controlled iblb4GU for years, well-controlled HTN, previously stable CKD which deteriorated significantly in the last year (serum Cr now 5.47, GFR 12).  Although he is not demonstrating any clear signs of uremia, Dr. Ruiz believes he may require renal replacement in the near future.  Pt is RHD, denies any injuries/infections/issues in either UE.

## 2024-01-31 NOTE — PROCEDURE
[FreeTextEntry1] : Venous duplex performed to map the LUE veins for AVF creation demonstrates adequate L cephalic vein and L upper arm basilic vein for AVF creation, Radial a 35cm/sec, Ulnar a 75cm/sec, Basilic a 65cm/sec (measurements on system)

## 2024-01-31 NOTE — PHYSICAL EXAM
[Normal Thyroid] : the thyroid was normal [Normal Breath Sounds] : Normal breath sounds [Respiratory Effort] : normal respiratory effort [Normal Heart Sounds] : normal heart sounds [Normal Rate and Rhythm] : normal rate and rhythm [2+] : left 2+ [No HSM] : no hepatosplenomegaly [No Rash or Lesion] : No rash or lesion [Alert] : alert [Calm] : calm [JVD] : no jugular venous distention  [Carotid Bruits] : no carotid bruits [Right Carotid Bruit] : no bruit heard over the right carotid [Left Carotid Bruit] : no bruit heard over the left carotid [Ankle Swelling (On Exam)] : not present [Varicose Veins Of Lower Extremities] : not present [] : not present [Abdomen Masses] : No abdominal masses [Abdomen Tenderness] : ~T ~M No abdominal tenderness [Purpura] : no purpura  [Petechiae] : no petechiae [Skin Ulcer] : no ulcer [Skin Induration] : no induration [de-identified] : Healthy, NAD [de-identified] : NC/AT, anicteric [FreeTextEntry1] : Large, visible UE veins b/l, no palpable cords [de-identified] : FROM throughout, strength 5/5x4, no palpable cords in the UEs b/l [de-identified] : Neurosensory/neuromotor grossly intact

## 2024-01-31 NOTE — ASSESSMENT
[FreeTextEntry1] : 68yoM w/PMHx of poorly controlled [id number] for years, well-controlled HTN, previously stable CKD which deteriorated significantly in the last year (serum Cr now 5.47, GFR 12).  Although he is not demonstrating any clear signs of uremia, Dr. Ruiz believes he may require renal replacement in the near future.  Pt is RHD, denies any injuries/infections/issues in either UE.  Strong radial/ulnar/brachial pulses noted and venous duplex performed to map the LUE veins for AVF creation demonstrates adequate L cephalic vein and L upper arm basilic vein for AVF creation, Radial a 35cm/sec, Ulnar a 75cm/sec, Basilic a 65cm/sec (measurements on system).  Pt eager to have AVF placed to allow time for maturation; will schedule for L brachiocephalic AVF creation in OR 02/07/2024.  Instructed pt to hold Farxiga 3d prior to scheduled procedure.

## 2024-01-31 NOTE — ADDENDUM
[FreeTextEntry1] : This note was written by Steven Cowan, acting as a scribe for Dr. Cassie Lynne.  I, Dr. Cassie Lynne, have read and attest that all the information, medical decision-making, and discharge instructions within are true and accurate.  I, Dr. Cassie Lynne, personally performed the evaluation and management (E/M) services for this new patient.  That E/M includes conducting the initial examination, assessing all conditions, and establishing the plan of care.  Today, my ACP, Steven Cowan, was here to observe my evaluation and management services for this patient to be followed going forward.

## 2024-01-31 NOTE — REASON FOR VISIT
[Consultation] : a consultation visit [Family Member] : family member [FreeTextEntry1] : Stage 4 CKD, asymptomatic

## 2024-02-01 ENCOUNTER — APPOINTMENT (OUTPATIENT)
Dept: NEPHROLOGY | Facility: CLINIC | Age: 69
End: 2024-02-01
Payer: MEDICARE

## 2024-02-01 VITALS
WEIGHT: 197 LBS | DIASTOLIC BLOOD PRESSURE: 70 MMHG | SYSTOLIC BLOOD PRESSURE: 126 MMHG | HEIGHT: 69 IN | BODY MASS INDEX: 29.18 KG/M2

## 2024-02-01 DIAGNOSIS — R80.1 PERSISTENT PROTEINURIA, UNSPECIFIED: ICD-10-CM

## 2024-02-01 PROCEDURE — G2211 COMPLEX E/M VISIT ADD ON: CPT

## 2024-02-01 PROCEDURE — 99215 OFFICE O/P EST HI 40 MIN: CPT

## 2024-02-01 NOTE — ASSESSMENT
[FreeTextEntry1] : 68-year-old male with stage V CKD who is not yet overtly uremic, but probably getting close with a GFR of about 12.  He will have fistula creation next week.  He will call [facility] to set up transplant evaluation.  His daughter accompanied him and was part of the discussion very actively.  He will return in 2 months, and promises to call if he faces any new symptoms.  I reminded him to stop aspirin before surgery, starting today, and to stop Farxiga 3 days before surgery.  I suggested he reduce his protein intake to 60 to 70 g/day.   Time spent 45 minutes

## 2024-02-01 NOTE — HISTORY OF PRESENT ILLNESS
[FreeTextEntry1] : 68-year-old man with a history of poorly controlled type 2 diabetes for many years, well-controlled hypertension, and CKD which deteriorated significantly in the last year.  His creatinine dale from 4.8 in October up to 5.5 and BUN dale from , with GFR dropping to the range of 11-12.  His A1c was 7.2 with a K of 4.2 and a PTH that was normal.  His latest labs done this week show a UACR of 419, creatinine 5.2, BUN 85, GFR 12, phosphorus 5.4, calcium 9.6, hemoglobin 12.1, A1c 6.2 he has lost over 20 pounds, as a result of increased exercise, better diet.  He was seeing a nutritionist and agrees and is following an 80 g protein diet restricted in carbohydrates.  At my urging, he saw Dr Lynne and had vein mapping.  His veins are excellent and he is scheduled for a left brachiocephalic fistula creation on February 7.  His appetite is good, with no nausea, no vomiting, no pruritus.  We discussed kidney transplantation, and he is eager to consider it.  I recommended [facility], and we also discussed being listed out of area, such as [place] or [place].  In the meantime they will contact transplant coordinator Cassandra Shipley .   He does have gingival hyperplasia, so I have discussed with him decreasing the dose of amlodipine.  On the other hand tight BP control is important for him.

## 2024-02-06 VITALS
TEMPERATURE: 97 F | WEIGHT: 201.5 LBS | OXYGEN SATURATION: 99 % | SYSTOLIC BLOOD PRESSURE: 143 MMHG | RESPIRATION RATE: 16 BRPM | HEART RATE: 71 BPM | HEIGHT: 69 IN | DIASTOLIC BLOOD PRESSURE: 74 MMHG

## 2024-02-06 RX ORDER — DAPAGLIFLOZIN 10 MG/1
1 TABLET, FILM COATED ORAL
Refills: 0 | DISCHARGE

## 2024-02-06 RX ORDER — DAPAGLIFLOZIN 10 MG/1
1 TABLET, FILM COATED ORAL
Qty: 0 | Refills: 0 | DISCHARGE

## 2024-02-06 RX ORDER — ASPIRIN/CALCIUM CARB/MAGNESIUM 324 MG
1 TABLET ORAL
Qty: 0 | Refills: 0 | DISCHARGE

## 2024-02-06 NOTE — ASU PATIENT PROFILE, ADULT - PATIENT REPRESENTATIVE NAME
After Visit Summary   10/31/2018    Richard Loza    MRN: [id number]           Patient Information     Date Of Birth          1969        Visit Information        Provider Department      10/31/2018 9:00 AM Muna Mcclain PA-C [facility] [place]        Today's Diagnoses     Need for prophylactic vaccination and inoculation against influenza    -  1    Attention deficit hyperactivity disorder (ADHD), combined type        Controlled substance agreement signed - 10.17.2017          Care Instructions      Controlling High Blood Pressure  High blood pressure (hypertension) is often called the silent killer. This is because many people who have it don t know it. High blood pressure can raise your risk of heart attack, stroke, and heart failure. Controlling your blood pressure can decrease your risk of these problems. Know your blood pressure and remember to check it regularly. Doing so can save your life.  Blood pressure measurements are given as 2 numbers. Systolic blood pressure is the upper number. This is the pressure when the heart contracts. Diastolic blood pressure is the lower number. This is the pressure when the heart relaxes between beats.  Blood pressure is categorized as normal, elevated, or stage 1 or stage 2 high blood pressure:    Normal blood pressure is systolic of less than 120 and diastolic of less than 80 (120/80)    Elevated blood pressure is systolic of 120 to 129 and diastolic less than 80    Stage 1 high blood pressure is systolic is 130 to 139 or diastolic between 80 to 89    Stage 2 high blood pressure is when systolic is 140 or higher or the diastolic is 90 or higher  Here are some things you can do to help control your blood pressure.    Choose heart-healthy foods    Select low-salt, low-fat foods. Limit sodium intake to 2,400 mg per day or the amount suggested by your healthcare provider.    Limit canned, dried, cured, packaged, and fast foods. These can  contain a lot of salt.    Eat 8 to 10 servings of fruits and vegetables every day.    Choose lean meats, fish, or chicken.    Eat whole-grain pasta, brown rice, and beans.    Eat 2 to 3 servings of low-fat or fat-free dairy products.    Ask your doctor about the DASH eating plan. This plan helps reduce blood pressure.    When you go to a restaurant, ask that your meal be prepared with no added salt.  Maintain a healthy weight    Ask your healthcare provider how many calories to eat a day. Then stick to that number.    Ask your healthcare provider what weight range is healthiest for you. If you are overweight, a weight loss of only 3% to 5% of your body weight can help lower blood pressure. Generally, a good weight loss goal is to lose 10% of your body weight in a year.    Limit snacks and sweets.    Get regular exercise.  Get up and get active    Choose activities you enjoy. Find ones you can do with friends or family. This includes bicycling, dancing, walking, and jogging.    Park farther away from building entrances.    Use stairs instead of the elevator.    When you can, walk or bike instead of driving.    [place] leaves, garden, or do household repairs.    Be active at a moderate to vigorous level of physical activity for at least 40 minutes for a minimum of 3 to 4 days a week.   Manage stress    Make time to relax and enjoy life. Find time to laugh.    Communicate your concerns with your loved ones and your healthcare provider.    Visit with family and friends, and keep up with hobbies.  Limit alcohol and quit smoking    Men should have no more than 2 drinks per day.    Women should have no more than 1 drink per day.    Talk with your healthcare provider about quitting smoking. Smoking significantly increases your risk for heart disease and stroke. Ask your healthcare provider about community smoking cessation programs and other options.  Medicines  If lifestyle changes aren t enough, your healthcare provider may  prescribe high blood pressure medicine. Take all medicines as prescribed. If you have any questions about your medicines, ask your healthcare provider before stopping or changing them.   Date Last Reviewed: 4/27/2016 2000-2017 [employer]. [address] [place] [address], [place] [address]. All rights reserved. This information is not intended as a substitute for professional medical care. Always follow your healthcare professional's instructions.                Follow-ups after your visit        Follow-up notes from your care team     Return in about 2 weeks (around 11/14/2018) for BP Recheck, nurse appointment.      Who to contact     If you have questions or need follow up information about today's clinic visit or your schedule please contact [facility] directly at [phone].  Normal or non-critical lab and imaging results will be communicated to you by MyChart, letter or phone within 4 business days after the clinic has received the results. If you do not hear from us within 7 days, please contact the clinic through [employer] or phone. If you have a critical or abnormal lab result, we will notify you by phone as soon as possible.  Submit refill requests through [employer] or call your pharmacy and they will forward the refill request to us. Please allow 3 business days for your refill to be completed.          Additional Information About Your Visit        [employer] Information     [employer] gives you secure access to your electronic health record. If you see a primary care provider, you can also send messages to your care team and make appointments. If you have questions, please call your primary care clinic.  If you do not have a primary care provider, please call [phone] and they will assist you.        Care EveryWhere ID     This is your Care EveryWhere ID. This could be used by other organizations to access your [facility] medical records  [id number]        Your Vitals Were     Pulse  "Temperature Height Pulse Oximetry BMI (Body Mass Index)       92 98.6  F (37  C) (Oral) 5' 9\" (1.753 m) 96% 27.47 kg/m2        Blood Pressure from Last 3 Encounters:   10/31/18 (!) 129/93   07/23/18 124/86   06/19/18 110/76    Weight from Last 3 Encounters:   10/31/18 186 lb (84.4 kg)   07/23/18 186 lb (84.4 kg)   06/19/18 186 lb (84.4 kg)              We Performed the Following     Drug  Screen Comprehensive , Urine with Reported Meds (MedTox) (Pain Care Package)     FLU VACCINE, SPLIT VIRUS, IM (QUADRIVALENT) [38807]- >3 YRS     Vaccine Administration, Initial [43597]          Where to get your medicines      Some of these will need a paper prescription and others can be bought over the counter.  Ask your nurse if you have questions.     Bring a paper prescription for each of these medications     amphetamine-dextroamphetamine 5 MG per tablet          Primary Care Provider Office Phone # Fax #    Muna Mcclain PA-C [phone]       [address] [address]        Equal Access to Services     [facility] DURAN : Hadii tor paz hadasho Sochantalali, waaxda luqadaha, qaybta kaalmada adeegyada, viky clifton. So [facility] [phone].    ATENCIÓN: Si habla español, tiene a qureshi disposición servicios gratuitos de asistencia lingüística. [facility] [phone].    We comply with applicable federal civil rights laws and [place] laws. We do not discriminate on the basis of race, color, national origin, age, disability, sex, sexual orientation, or gender identity.            Thank you!     Thank you for choosing [facility]  for your care. Our goal is always to provide you with excellent care. Hearing back from our patients is one way we can continue to improve our services. Please take a few minutes to complete the written survey that you may receive in the mail after your visit with us. Thank you!             Your Updated Medication List - Protect " others around you: Learn how to safely use, store and throw away your medicines at www.disposemymeds.org.          This list is accurate as of 10/31/18  9:29 AM.  Always use your most recent med list.                   Brand Name Dispense Instructions for use Diagnosis    amphetamine-dextroamphetamine 5 MG per tablet    ADDERALL    120 tablet    Take 2 tablets in the morning, then 1 tab around noon, and 1 tab in the afternoon.    Attention deficit hyperactivity disorder (ADHD), combined type, Controlled substance agreement signed       colestipol 1 g tablet    COLESTID    90 tablet    Take 1 tablet (1 g) by mouth daily    Crohn's disease of both small and large intestine without complication (H)       HUMIRA PEN 40 MG/0.8ML injection   Generic drug:  adalimumab           sertraline 50 MG tablet    ZOLOFT    90 tablet    Take 1 tablet (50 mg) by mouth daily    Anxiety state          Marjorie (daughter)

## 2024-02-06 NOTE — ASU PATIENT PROFILE, ADULT - NSICDXPASTMEDICALHX_GEN_ALL_CORE_FT
PAST MEDICAL HISTORY:  DM (diabetes mellitus) type 2    Fatty liver     High cholesterol     HTN (hypertension)     Stage 5 chronic kidney disease not on chronic dialysis

## 2024-02-07 ENCOUNTER — OUTPATIENT (OUTPATIENT)
Dept: OUTPATIENT SERVICES | Facility: HOSPITAL | Age: 69
LOS: 1 days | Discharge: HOME | End: 2024-02-07
Payer: MEDICARE

## 2024-02-07 ENCOUNTER — APPOINTMENT (OUTPATIENT)
Dept: VASCULAR SURGERY | Facility: HOSPITAL | Age: 69
End: 2024-02-07

## 2024-02-07 ENCOUNTER — TRANSCRIPTION ENCOUNTER (OUTPATIENT)
Age: 69
End: 2024-02-07

## 2024-02-07 VITALS
SYSTOLIC BLOOD PRESSURE: 122 MMHG | RESPIRATION RATE: 9 BRPM | OXYGEN SATURATION: 98 % | DIASTOLIC BLOOD PRESSURE: 78 MMHG | TEMPERATURE: 98 F | HEART RATE: 63 BPM

## 2024-02-07 DIAGNOSIS — Z98.890 OTHER SPECIFIED POSTPROCEDURAL STATES: Chronic | ICD-10-CM

## 2024-02-07 LAB — GLUCOSE BLDC GLUCOMTR-MCNC: 122 MG/DL — HIGH (ref 70–99)

## 2024-02-07 PROCEDURE — 82962 GLUCOSE BLOOD TEST: CPT

## 2024-02-07 PROCEDURE — 36821 AV FUSION DIRECT ANY SITE: CPT | Mod: GC

## 2024-02-07 PROCEDURE — 36821 AV FUSION DIRECT ANY SITE: CPT | Mod: LT

## 2024-02-07 PROCEDURE — C1889: CPT

## 2024-02-07 DEVICE — SURGCEL 4 X 8": Type: IMPLANTABLE DEVICE | Status: FUNCTIONAL

## 2024-02-07 DEVICE — LIGATING CLIPS WECK HORIZON SMALL-WIDE (RED) 24: Type: IMPLANTABLE DEVICE | Status: FUNCTIONAL

## 2024-02-07 DEVICE — LIGATING CLIPS WECK HORIZON MEDIUM (BLUE) 24: Type: IMPLANTABLE DEVICE | Status: FUNCTIONAL

## 2024-02-07 RX ORDER — SODIUM BICARBONATE 1 MEQ/ML
1 SYRINGE (ML) INTRAVENOUS
Refills: 0 | DISCHARGE

## 2024-02-07 RX ORDER — CALCITRIOL 0.5 UG/1
1 CAPSULE ORAL
Refills: 0 | DISCHARGE

## 2024-02-07 RX ORDER — SIMVASTATIN 20 MG/1
1 TABLET, FILM COATED ORAL
Qty: 0 | Refills: 0 | DISCHARGE

## 2024-02-07 RX ORDER — ICOSAPENT ETHYL 500 MG/1
2 CAPSULE, LIQUID FILLED ORAL
Refills: 0 | DISCHARGE

## 2024-02-07 RX ORDER — LOSARTAN POTASSIUM 100 MG/1
1 TABLET, FILM COATED ORAL
Qty: 0 | Refills: 0 | DISCHARGE

## 2024-02-07 RX ORDER — SEVELAMER CARBONATE 2400 MG/1
2 POWDER, FOR SUSPENSION ORAL
Refills: 0 | DISCHARGE

## 2024-02-07 RX ORDER — AMLODIPINE BESYLATE 2.5 MG/1
1 TABLET ORAL
Qty: 0 | Refills: 0 | DISCHARGE

## 2024-02-07 NOTE — ASU DISCHARGE PLAN (ADULT/PEDIATRIC) - NS MD DC FALL RISK RISK
For information on Fall & Injury Prevention, visit: [email or web address] OR  [email or web address] OR  https://www.cdc.gov/steadi/patient.html

## 2024-02-07 NOTE — BRIEF OPERATIVE NOTE - NSICDXBRIEFPROCEDURE_GEN_ALL_CORE_FT
PROCEDURES:  Creation of left brachiocephalic arteriovenous fistula 07-Feb-2024 12:56:23  Beatriz Vance

## 2024-02-07 NOTE — BRIEF OPERATIVE NOTE - OPERATION/FINDINGS
LUE BC fistula created using running 6-0 prolene. Vein was 3mm, Artery was 2mm. Palpable thrill and radial pulse at conclusion

## 2024-02-08 ENCOUNTER — TRANSCRIPTION ENCOUNTER (OUTPATIENT)
Age: 69
End: 2024-02-08

## 2024-02-08 PROBLEM — E11.9 TYPE 2 DIABETES MELLITUS WITHOUT COMPLICATIONS: Chronic | Status: ACTIVE | Noted: 2019-05-29

## 2024-02-08 PROBLEM — K76.0 FATTY (CHANGE OF) LIVER, NOT ELSEWHERE CLASSIFIED: Chronic | Status: ACTIVE | Noted: 2024-02-06

## 2024-02-08 RX ORDER — CALCITRIOL 0.25 UG/1
0.25 CAPSULE, LIQUID FILLED ORAL
Qty: 90 | Refills: 3 | Status: DISCONTINUED | COMMUNITY
Start: 2023-01-17 | End: 2024-02-08

## 2024-02-08 RX ORDER — DAPAGLIFLOZIN 5 MG/1
5 TABLET, FILM COATED ORAL DAILY
Qty: 30 | Refills: 0 | Status: DISCONTINUED | COMMUNITY
Start: 2018-03-23 | End: 2024-02-08

## 2024-02-13 ENCOUNTER — APPOINTMENT (OUTPATIENT)
Dept: VASCULAR SURGERY | Facility: CLINIC | Age: 69
End: 2024-02-13
Payer: MEDICARE

## 2024-02-13 VITALS
HEART RATE: 79 BPM | BODY MASS INDEX: 29.18 KG/M2 | WEIGHT: 197 LBS | DIASTOLIC BLOOD PRESSURE: 72 MMHG | SYSTOLIC BLOOD PRESSURE: 123 MMHG | HEIGHT: 69 IN

## 2024-02-13 PROBLEM — N18.5 CHRONIC KIDNEY DISEASE, STAGE 5: Chronic | Status: ACTIVE | Noted: 2024-02-06

## 2024-02-13 PROCEDURE — 99024 POSTOP FOLLOW-UP VISIT: CPT

## 2024-02-13 NOTE — ADDENDUM
[FreeTextEntry1] : This note was written by Steven Cowan, acting as a scribe for Dr. Cassie Lynne.  I, Dr. Cassie Lynne, have read and attest that all the information, medical decision-making, and discharge instructions within are true and accurate.  I, Dr. Cassie Lynne, personally performed the evaluation and management (E/M) services for this established patient who presents today with (an) existing condition(s).  That E/M includes conducting the examination, assessing all conditions, and (re)establishing/reinforcing a plan of care.  Today, my ACP, Steven Cowan, was here to observe my evaluation and management services for this condition to be followed going forward.

## 2024-02-13 NOTE — PHYSICAL EXAM
[JVD] : no jugular venous distention  [Normal Thyroid] : the thyroid was normal [Carotid Bruits] : no carotid bruits [Normal Breath Sounds] : Normal breath sounds [Respiratory Effort] : normal respiratory effort [Normal Heart Sounds] : normal heart sounds [Normal Rate and Rhythm] : normal rate and rhythm [Right Carotid Bruit] : no bruit heard over the right carotid [Left Carotid Bruit] : no bruit heard over the left carotid [2+] : left 2+ [Ankle Swelling (On Exam)] : not present [Varicose Veins Of Lower Extremities] : not present [] : not present [Abdomen Masses] : No abdominal masses [Abdomen Tenderness] : ~T ~M No abdominal tenderness [No HSM] : no hepatosplenomegaly [No Rash or Lesion] : No rash or lesion [Purpura] : no purpura  [Petechiae] : no petechiae [Skin Ulcer] : no ulcer [Skin Induration] : no induration [Alert] : alert [Calm] : calm [de-identified] : Healthy, NAD [de-identified] : NC/AT, anicteric [FreeTextEntry1] : +strong thrill/bruit noted on exam, palpable AVF noted in the upper arm [de-identified] : FROM throughout, strength 5/5x4, no palpable cords in the UEs b/l [de-identified] : Neurosensory/neuromotor grossly intact

## 2024-02-13 NOTE — REASON FOR VISIT
[de-identified] : Creation of L brachiocephalc AVF [de-identified] : 02/07/2024 [de-identified] : 6 [de-identified] : 68yoM w/CRI not yet requiring HD, returning after successful creation of L brachiocephalic AVF 6d prior.  Pt reports no new symptoms in the arm and states that his surgical incision is closed and clean w/o drainage or redness.

## 2024-02-13 NOTE — DISCUSSION/SUMMARY
[FreeTextEntry1] : 68yoM w/CRI not yet requiring HD, returning after successful creation of L brachiocephalic AVF 6d prior.  Pt reports no new symptoms in the arm and states that his surgical incision is closed and clean w/o drainage or redness.  On exam, strong thrill/bruit is noted, palpable AVF noted in the upper arm.  Explained to pt that his AVF will likely require ~8wks to mature, but as he is not yet requiring HD, he can exercise the hand regularly to promote maturation and we can monitor in the office regularly.

## 2024-03-06 ENCOUNTER — TRANSCRIPTION ENCOUNTER (OUTPATIENT)
Age: 69
End: 2024-03-06

## 2024-03-17 ENCOUNTER — TRANSCRIPTION ENCOUNTER (OUTPATIENT)
Age: 69
End: 2024-03-17

## 2024-03-20 ENCOUNTER — TRANSCRIPTION ENCOUNTER (OUTPATIENT)
Age: 69
End: 2024-03-20

## 2024-03-22 ENCOUNTER — TRANSCRIPTION ENCOUNTER (OUTPATIENT)
Age: 69
End: 2024-03-22

## 2024-03-22 RX ORDER — ICOSAPENT ETHYL 1000 MG/1
1 CAPSULE ORAL
Qty: 360 | Refills: 3 | Status: ACTIVE | COMMUNITY
Start: 2021-11-24 | End: 1900-01-01

## 2024-03-26 ENCOUNTER — APPOINTMENT (OUTPATIENT)
Dept: VASCULAR SURGERY | Facility: CLINIC | Age: 69
End: 2024-03-26
Payer: MEDICARE

## 2024-03-26 VITALS
HEART RATE: 79 BPM | BODY MASS INDEX: 29.18 KG/M2 | HEIGHT: 69 IN | WEIGHT: 197 LBS | SYSTOLIC BLOOD PRESSURE: 138 MMHG | DIASTOLIC BLOOD PRESSURE: 74 MMHG

## 2024-03-26 PROCEDURE — 99024 POSTOP FOLLOW-UP VISIT: CPT

## 2024-03-27 NOTE — REASON FOR VISIT
[de-identified] : 02/07/2024 [de-identified] : Creation of L brachiocephalc AVF [de-identified] : 6 [de-identified] : 68yoM w/CRI not yet requiring HD, returning after successful creation of L brachiocephalic AVF 6wks prior.  Pt reports no new symptoms in the arm and states that his surgical incision is closed and clean w/o drainage or redness.

## 2024-03-27 NOTE — PHYSICAL EXAM
[JVD] : no jugular venous distention  [Carotid Bruits] : no carotid bruits [Normal Thyroid] : the thyroid was normal [Respiratory Effort] : normal respiratory effort [Normal Breath Sounds] : Normal breath sounds [Normal Heart Sounds] : normal heart sounds [Normal Rate and Rhythm] : normal rate and rhythm [Left Carotid Bruit] : no bruit heard over the left carotid [Right Carotid Bruit] : no bruit heard over the right carotid [2+] : left 2+ [Ankle Swelling (On Exam)] : not present [Varicose Veins Of Lower Extremities] : not present [] : not present [Abdomen Masses] : No abdominal masses [Abdomen Tenderness] : ~T ~M No abdominal tenderness [No HSM] : no hepatosplenomegaly [No Rash or Lesion] : No rash or lesion [Purpura] : no purpura  [Petechiae] : no petechiae [Skin Ulcer] : no ulcer [Skin Induration] : no induration [Alert] : alert [Calm] : calm [de-identified] : Healthy, NAD [de-identified] : NC/AT, anicteric [FreeTextEntry1] : +strong thrill/bruit noted on exam, palpable AVF noted in the upper arm [de-identified] : FROM throughout, strength 5/5x4, no palpable cords in the UEs b/l [de-identified] : Neurosensory/neuromotor grossly intact

## 2024-03-27 NOTE — DISCUSSION/SUMMARY
[FreeTextEntry1] : 68yoM w/CRI not yet requiring HD, returning after successful creation of L brachiocephalic AVF 6d prior.  Pt reports no new symptoms in the arm and states that his surgical incision is closed and clean w/o drainage or redness.  On exam, strong thrill/bruit is noted, palpable AVF noted in the upper arm.  As pt still does not require renal replacement, encouraged him to continue exercising the hand to improve flow into the fistula and the hand.  Pt will RTO in 6mos for surveillance or sooner if symptoms develop.

## 2024-04-01 ENCOUNTER — APPOINTMENT (OUTPATIENT)
Dept: NEPHROLOGY | Facility: CLINIC | Age: 69
End: 2024-04-01
Payer: MEDICARE

## 2024-04-01 VITALS
BODY MASS INDEX: 29.18 KG/M2 | HEIGHT: 69 IN | WEIGHT: 197 LBS | DIASTOLIC BLOOD PRESSURE: 65 MMHG | SYSTOLIC BLOOD PRESSURE: 122 MMHG

## 2024-04-01 PROCEDURE — G2211 COMPLEX E/M VISIT ADD ON: CPT

## 2024-04-01 PROCEDURE — 99215 OFFICE O/P EST HI 40 MIN: CPT

## 2024-04-01 NOTE — PHYSICAL EXAM
[General Appearance - Alert] : alert [General Appearance - In No Acute Distress] : in no acute distress [Sclera] : the sclera and conjunctiva were normal [PERRL With Normal Accommodation] : pupils were equal in size, round, and reactive to light [Outer Ear] : the ears and nose were normal in appearance [Neck Appearance] : the appearance of the neck was normal [Neck Cervical Mass (___cm)] : no neck mass was observed [Jugular Venous Distention Increased] : there was no jugular-venous distention [Skin Turgor] : normal skin turgor [Skin Color & Pigmentation] : normal skin color and pigmentation [] : no rash [Deep Tendon Reflexes (DTR)] : deep tendon reflexes were 2+ and symmetric [Sensation] : the sensory exam was normal to light touch and pinprick [No Focal Deficits] : no focal deficits [Oriented To Time, Place, And Person] : oriented to person, place, and time [Impaired Insight] : insight and judgment were intact [Affect] : the affect was normal

## 2024-04-01 NOTE — ASSESSMENT
[FreeTextEntry1] : 68-year-old man with stage V CKD but no uremic symptoms or signs.  He is actively undergoing transplant evaluation at [facility], and he also has been in touch with the [facility] transplant [place].  They will be forwarding his [facility] results to [place].  His fistula is mature and ready to be used.  He is determined to spend the summer in [place] and I believe that is a safe option.  There is a dialysis unit adjacent to his apartment there.  He will have blood drawn at least every 4 to 6 weeks.  He will visit here 1 more time before leaving in mid May.  Time spent 45 minutes

## 2024-04-01 NOTE — HISTORY OF PRESENT ILLNESS
[FreeTextEntry1] : 68-year-old man with a history of poorly controlled type 2 diabetes for many years, well-controlled hypertension, and CKD that deteriorated in the last year.  His creatinine is now 5.08, BUN 78, GFR 12, K4.3, CO2 21, phosphorus 5.0, hemoglobin 12.0, A1c 5.8, calcium 10.2, PTH 17.  So his diabetes is now very well-controlled.  When I last saw him 2 months ago, his BP was 126/70, indicating also good control.  I sent him to Dr. Lynne, who performed an AV fistula creation, left brachiocephalic, on February 7 and he is doing well with a good bruit and thrill.  Vein development has been excellent and that fistula is ready to be used at any time.  We also had lengthy discussions about transplant options and I recommended he get listed at [facility] here in [place], as well as [place].  His evaluation at [facility] is proceeding, and he passed his stress test.  An echocardiogram was done and it sounds as if it showed a calcified valve but nothing terrible.  CT of the chest showed a lung nodule and the CT will be repeated.  He wants to spend the summer in [facility] and leave in mid May.  That should be feasible.  He will have blood drawn while there and just in case, there is a dialysis unit write down the block from where he lives.  He has been in touch with the [facility] transplant [place] at the [facility] -that evaluation is on hold right now.  He feels very well now, much better than a couple of months ago.  Weight loss and increased exercise have helped.  His appetite is robust with no nausea or vomiting.  He has no pruritus.  He does not complain of fatigue, probably because his hemoglobin is unusually good for his level of renal function.  He is not chilly.  He was accompanied, as usual, by his devoted daughter.

## 2024-04-17 ENCOUNTER — TRANSCRIPTION ENCOUNTER (OUTPATIENT)
Age: 69
End: 2024-04-17

## 2024-05-08 ENCOUNTER — APPOINTMENT (OUTPATIENT)
Dept: NEPHROLOGY | Facility: CLINIC | Age: 69
End: 2024-05-08
Payer: MEDICARE

## 2024-05-08 VITALS
DIASTOLIC BLOOD PRESSURE: 58 MMHG | SYSTOLIC BLOOD PRESSURE: 116 MMHG | BODY MASS INDEX: 29.18 KG/M2 | HEIGHT: 69 IN | WEIGHT: 197 LBS

## 2024-05-08 DIAGNOSIS — R80.9 PROTEINURIA, UNSPECIFIED: ICD-10-CM

## 2024-05-08 DIAGNOSIS — C34.90 MALIGNANT NEOPLASM OF UNSPECIFIED PART OF UNSPECIFIED BRONCHUS OR LUNG: ICD-10-CM

## 2024-05-08 PROCEDURE — 99215 OFFICE O/P EST HI 40 MIN: CPT

## 2024-05-08 PROCEDURE — G2211 COMPLEX E/M VISIT ADD ON: CPT

## 2024-05-08 NOTE — CONSULT LETTER
[Dear  ___] : Dear  [unfilled], [Consult Letter:] : I had the pleasure of evaluating your patient, [unfilled]. [Please see my note below.] : Please see my note below. [Consult Closing:] : Thank you very much for allowing me to participate in the care of this patient.  If you have any questions, please do not hesitate to contact me. [Sincerely,] : Sincerely, [FreeTextEntry2] : Dr Arriaza - CT surgery, [facility] [FreeTextEntry3] : Sincerely,   Phillip Ruiz MD, FACP

## 2024-05-08 NOTE — ASSESSMENT
[FreeTextEntry1] : 68-year-old man with newly discovered carcinoma of the lung in the setting of CKD 5, with relatively imminent likelihood of dialysis dependency -transplant planning is now off, and chemotherapy is likely to start shortly, with planning for possible surgery shortly.   I have ordered labs to be repeated within about a month, including CBC, CMP, phosphorus, Cystatin C, uric acid.  Time spent 45 min

## 2024-05-08 NOTE — PHYSICAL EXAM
[General Appearance - Alert] : alert [General Appearance - In No Acute Distress] : in no acute distress [Sclera] : the sclera and conjunctiva were normal [PERRL With Normal Accommodation] : pupils were equal in size, round, and reactive to light [Outer Ear] : the ears and nose were normal in appearance [Neck Appearance] : the appearance of the neck was normal [Neck Cervical Mass (___cm)] : no neck mass was observed [Jugular Venous Distention Increased] : there was no jugular-venous distention [Heart Rate And Rhythm] : heart rate was normal and rhythm regular [Heart Sounds] : normal S1 and S2 [Heart Sounds Gallop] : no gallops [Murmurs] : no murmurs [Heart Sounds Pericardial Friction Rub] : no pericardial rub [Skin Color & Pigmentation] : normal skin color and pigmentation [Skin Turgor] : normal skin turgor [] : no rash [No Focal Deficits] : no focal deficits [Oriented To Time, Place, And Person] : oriented to person, place, and time [Impaired Insight] : insight and judgment were intact [Affect] : the affect was normal

## 2024-05-08 NOTE — HISTORY OF PRESENT ILLNESS
[FreeTextEntry1] : 68-year-old man with a history of poorly controlled type 2 diabetes for many years, well-controlled hypertension, deteriorating CKD, with creatinine now up to 5.8, BUN 90, GFR 10, K3.8, CO2 20, PTH 25, phosphorus 5.2, hemoglobin 11.8.  Fortunately, he has no uremic signs or symptoms, with a preserved appetite, no nausea or vomiting, no pruritus.  I had a left arm brachiocephalic fistula created 3 months ago, which is excellent with a good bruit and thrill, and excellent vein development.  I sent him to [facility] for transplant evaluation and unfortunately it turns out that bronchoscopy there has shown a surprise finding of a stage IIIa lung carcinoma.  So transplant planning is now totally off.  He has been referred to oncology to be seen within the next few days, and cardiothoracic surgery, Dr. Arriaza.  He ordinarily spends the summer in [facility] every year, but this will not be feasible this year.  He was accompanied by his daughter as usual, who is extremely devoted and meticulous in guiding his care.  I have explained that there are particular nuances to administration of chemotherapy and immunotherapy with this degree of kidney impairment.  I suggested that we transfer his kidney care to Dr. James.

## 2024-05-22 ENCOUNTER — TRANSCRIPTION ENCOUNTER (OUTPATIENT)
Age: 69
End: 2024-05-22

## 2024-06-19 ENCOUNTER — APPOINTMENT (OUTPATIENT)
Dept: NEPHROLOGY | Facility: CLINIC | Age: 69
End: 2024-06-19
Payer: MEDICARE

## 2024-06-19 VITALS — HEIGHT: 69 IN | BODY MASS INDEX: 29.03 KG/M2 | WEIGHT: 196 LBS

## 2024-06-19 DIAGNOSIS — D63.1 CHRONIC KIDNEY DISEASE, UNSPECIFIED: ICD-10-CM

## 2024-06-19 DIAGNOSIS — I10 ESSENTIAL (PRIMARY) HYPERTENSION: ICD-10-CM

## 2024-06-19 DIAGNOSIS — N25.81 SECONDARY HYPERPARATHYROIDISM OF RENAL ORIGIN: ICD-10-CM

## 2024-06-19 DIAGNOSIS — E87.20 ACIDOSIS, UNSPECIFIED: ICD-10-CM

## 2024-06-19 DIAGNOSIS — E11.9 TYPE 2 DIABETES MELLITUS W/OUT COMPLICATIONS: ICD-10-CM

## 2024-06-19 DIAGNOSIS — E83.39 OTHER DISORDERS OF PHOSPHORUS METABOLISM: ICD-10-CM

## 2024-06-19 DIAGNOSIS — N18.9 CHRONIC KIDNEY DISEASE, UNSPECIFIED: ICD-10-CM

## 2024-06-19 DIAGNOSIS — N18.5 CHRONIC KIDNEY DISEASE, STAGE 5: ICD-10-CM

## 2024-06-19 PROCEDURE — 99443: CPT | Mod: 93

## 2024-06-19 RX ORDER — CALCITRIOL 0.5 UG/1
0.5 CAPSULE, LIQUID FILLED ORAL
Qty: 90 | Refills: 1 | Status: ACTIVE | COMMUNITY
Start: 2023-11-05 | End: 1900-01-01

## 2024-06-19 RX ORDER — SODIUM BICARBONATE 650 MG/1
650 TABLET ORAL TWICE DAILY
Qty: 180 | Refills: 1 | Status: ACTIVE | COMMUNITY
Start: 2021-11-24 | End: 1900-01-01

## 2024-06-19 RX ORDER — SEVELAMER CARBONATE 800 MG/1
800 TABLET, FILM COATED ORAL 3 TIMES DAILY
Qty: 270 | Refills: 1 | Status: ACTIVE | COMMUNITY
Start: 2024-01-02 | End: 1900-01-01

## 2024-06-19 RX ORDER — LOSARTAN POTASSIUM 100 MG/1
100 TABLET, FILM COATED ORAL
Qty: 90 | Refills: 1 | Status: ACTIVE | COMMUNITY
Start: 2018-03-02 | End: 1900-01-01

## 2024-06-19 RX ORDER — SIMVASTATIN 40 MG/1
40 TABLET, FILM COATED ORAL
Qty: 90 | Refills: 1 | Status: ACTIVE | COMMUNITY
Start: 2018-03-02 | End: 1900-01-01

## 2024-06-19 RX ORDER — AMLODIPINE BESYLATE 5 MG/1
5 TABLET ORAL
Qty: 90 | Refills: 2 | Status: ACTIVE | COMMUNITY
Start: 2024-06-19 | End: 1900-01-01

## 2024-06-19 NOTE — PHYSICAL EXAM
[General Appearance - Alert] : alert [General Appearance - In No Acute Distress] : in no acute distress [No Focal Deficits] : no focal deficits [Oriented To Time, Place, And Person] : oriented to person, place, and time [Impaired Insight] : insight and judgment were intact [Affect] : the affect was normal

## 2024-06-19 NOTE — HISTORY OF PRESENT ILLNESS
[Home] : at home, [unfilled] , at the time of the visit. [Medical Office: ([facility])___] : at the medical office located in  [Verbal consent obtained from patient] : the patient, [unfilled] [FreeTextEntry1] : Discussed with patient : You have chosen to receive care through the use of tele-media.  It enables healthcare providers at different locations to provide safe, effective, and convenient care through the use of technology.  Please note this is a billable encounter.  As with any healthcare service, there are risks associated with the use of tele-media, including  issues.  You understand that I cannot physically examine you and that you may need to come to the office to complete the assessment.   Patient agreed verbally and understands the risks and benefits of tele-media as explained.  All questions regarding tele-media encounters were answered.                                                                                                                                                                                       68-year-old man with a history of poorly controlled type 2 diabetes for many years, well-controlled hypertension, with deteriorating CKD.  His creatinine now is 5.1, with a BUN of 87, GFR 11-12, K4.5, CO2 19, phosphorus 5.0, hemoglobin stable at 11.7.  We sent him to [place] for transplant evaluation and in the course of that, he was found to have a stage IIIa lung cancer.  He is undergoing immunotherapy now and just had the second infusion 10 days ago.  He had some pruritus but that was controlled with Benadryl and steroids.  He does note fatigue.  His appetite is still intact, with no weight loss.  He will be hopefully undergoing surgery by Dr. Arriaza at [place] after the third immunotherapy is completed.  His PFTs were apparently very good.  This call was conducted with the patient and his daughter Dahiana.  He needs refills of 7 medications, including sodium bicarb, sevelamer, Vascepa, losartan, calcitriol, simvastatin, amlodipine.

## 2024-06-19 NOTE — ASSESSMENT
[FreeTextEntry1] : 68-year-old man approaching the need for dialysis, who in the course of transplant evaluation was found to have lung cancer.  He is being treated for that, and hopefully will have surgery within the next 6 to 8 weeks.  He is not overtly uremic yet and has not needed Depo.  I have renewed 7 medications, and we will repeat labs again in about 6 weeks.

## 2024-07-23 ENCOUNTER — APPOINTMENT (OUTPATIENT)
Dept: NEPHROLOGY | Facility: CLINIC | Age: 69
End: 2024-07-23
Payer: MEDICARE

## 2024-07-23 VITALS
SYSTOLIC BLOOD PRESSURE: 130 MMHG | WEIGHT: 198 LBS | DIASTOLIC BLOOD PRESSURE: 80 MMHG | BODY MASS INDEX: 29.33 KG/M2 | HEIGHT: 69 IN

## 2024-07-23 DIAGNOSIS — N18.9 CHRONIC KIDNEY DISEASE, UNSPECIFIED: ICD-10-CM

## 2024-07-23 DIAGNOSIS — N18.5 CHRONIC KIDNEY DISEASE, STAGE 5: ICD-10-CM

## 2024-07-23 DIAGNOSIS — E87.20 ACIDOSIS, UNSPECIFIED: ICD-10-CM

## 2024-07-23 DIAGNOSIS — D63.1 CHRONIC KIDNEY DISEASE, UNSPECIFIED: ICD-10-CM

## 2024-07-23 DIAGNOSIS — R80.1 PERSISTENT PROTEINURIA, UNSPECIFIED: ICD-10-CM

## 2024-07-23 DIAGNOSIS — E11.9 TYPE 2 DIABETES MELLITUS W/OUT COMPLICATIONS: ICD-10-CM

## 2024-07-23 DIAGNOSIS — I10 ESSENTIAL (PRIMARY) HYPERTENSION: ICD-10-CM

## 2024-07-23 PROCEDURE — 99443: CPT | Mod: 93

## 2024-07-23 NOTE — HISTORY OF PRESENT ILLNESS
[Home] : at home, [unfilled] , at the time of the visit. [Medical Office: ([facility])___] : at the medical office located in  [Verbal consent obtained from patient] : the patient, [unfilled] [FreeTextEntry1] : Discussed with patient : You have chosen to receive care through the use of tele-media.  It enables healthcare providers at different locations to provide safe, effective, and convenient care through the use of technology.  Please note this is a billable encounter.  As with any healthcare service, there are risks associated with the use of tele-media, including  issues.  You understand that I cannot physically examine you and that you may need to come to the office to complete the assessment.   Patient agreed verbally and understands the risks and benefits of tele-media as explained.  All questions regarding tele-media encounters were answered.                                    68-year-old man with a history of poorly controlled type 2 diabetes for many years, well-controlled hypertension, who is renal disease deteriorated and he developed stage V CKD.  We initiated transplant evaluation at [place], but in the course of that he was found to have a stage IIIa lung cancer.  He has undergone immunotherapy.  His appetite remains intact with no weight loss.  He does have some fatigue so he is walking less, totaling about 1 mile per day.  This call was conducted with the patient and his daughter Dahiana.  I have renewed 7 of his medications.  He had a visit yesterday with Dr. Vickers, with a BP of 146/74, weight of 198 pounds, O2 sat of 99%, pulse of 77.  His BP at home averages about 130 systolic, confirming that there is a significant element of whitecoat effect.  His labs from yesterday show a creatinine stable at 4.9, BUN stable at 85, K4.2, CO2 21, albumin 3.3, PTH 61, calcium 9.0, phosphorus 4.6, GFR up to 14 from a previous 11, white count 8.7, hemoglobin 10.7.  He denies any pruritus or shortness of breath.

## 2024-07-23 NOTE — CONSULT LETTER
[Dear  ___] : Dear  [unfilled], [Consult Letter:] : I had the pleasure of evaluating your patient, [unfilled]. [Please see my note below.] : Please see my note below. [Consult Closing:] : Thank you very much for allowing me to participate in the care of this patient.  If you have any questions, please do not hesitate to contact me. [Sincerely,] : Sincerely, [FreeTextEntry2] : [facility] [FreeTextEntry3] : Sincerely,   Phillip Ruiz MD, FACP

## 2024-07-23 NOTE — ASSESSMENT
[FreeTextEntry1] : 68-year-old man with stable stage V CKD, no uremic signs or symptoms, reasonable BP control -he is undergoing immunotherapy and is responding.  His PET scan now shows no metastases and the original lesion is decreasing in size.  He is handling all of this with levi and a bright demeanor.  Time spent 21 minutes

## 2024-08-09 ENCOUNTER — TRANSCRIPTION ENCOUNTER (OUTPATIENT)
Age: 69
End: 2024-08-09

## 2024-08-26 ENCOUNTER — APPOINTMENT (OUTPATIENT)
Dept: NEPHROLOGY | Facility: CLINIC | Age: 69
End: 2024-08-26
Payer: MEDICARE

## 2024-08-26 VITALS — WEIGHT: 193 LBS | HEIGHT: 69 IN | BODY MASS INDEX: 28.58 KG/M2

## 2024-08-26 DIAGNOSIS — N18.9 CHRONIC KIDNEY DISEASE, UNSPECIFIED: ICD-10-CM

## 2024-08-26 DIAGNOSIS — I10 ESSENTIAL (PRIMARY) HYPERTENSION: ICD-10-CM

## 2024-08-26 DIAGNOSIS — N18.5 CHRONIC KIDNEY DISEASE, STAGE 5: ICD-10-CM

## 2024-08-26 DIAGNOSIS — E87.20 ACIDOSIS, UNSPECIFIED: ICD-10-CM

## 2024-08-26 DIAGNOSIS — N25.81 SECONDARY HYPERPARATHYROIDISM OF RENAL ORIGIN: ICD-10-CM

## 2024-08-26 DIAGNOSIS — D63.1 CHRONIC KIDNEY DISEASE, UNSPECIFIED: ICD-10-CM

## 2024-08-26 DIAGNOSIS — R80.1 PERSISTENT PROTEINURIA, UNSPECIFIED: ICD-10-CM

## 2024-08-26 PROCEDURE — 99443: CPT | Mod: 93

## 2024-08-26 NOTE — HISTORY OF PRESENT ILLNESS
[Home] : at home, [unfilled] , at the time of the visit. [Medical Office: ([facility])___] : at the medical office located in  [Verbal consent obtained from patient] : the patient, [unfilled] [FreeTextEntry1] : Discussed with patient : You have chosen to receive care through the use of tele-media.  It enables healthcare providers at different locations to provide safe, effective, and convenient care through the use of technology.  Please note this is a billable encounter.  As with any healthcare service, there are risks associated with the use of tele-media, including  issues.  You understand that I cannot physically examine you and that you may need to come to the office to complete the assessment.   Patient agreed verbally and understands the risks and benefits of tele-media as explained.  All questions regarding tele-media encounters were answered.                           68-year-old man with a long history of poorly controlled type 2 diabetes, hypertension that was well-controlled, who developed stage V CKD.  I urged him to undergo transplant evaluation at [place], and in the course of doing that, a stage IIIa lung cancer was found.  He underwent immunotherapy, and then thoracic surgery by Dr. Arriaza about 1 month ago.  He is slowly regaining his strength now and went for a PT evaluation today.  He has an appetite with no nausea or vomiting and no pruritus.  His latest labs done this week show a creatinine of 5.18, BUN 89, GFR 11-12, K4.3, CO2 24, phosphorus 4.6, calcium 9.9, PTH 27, hemoglobin 9.8.  He needed insulin briefly postoperatively but his blood sugars improving now.  He was placed on metoprolol to tartrate 12.5 mg twice daily because of an irregular heart rate and he will be seeing his cardiologist in November.  He has an oncology visit scheduled for September 9.

## 2024-08-26 NOTE — ASSESSMENT
[FreeTextEntry1] : 68-year-old man with stage V CKD, now 1 month post removal of lung cancer at [place].  He is not overtly uremic.  Metoprolol was added and low-dose because of apparent arrhythmia.  I have ordered labs to be repeated in 6 weeks, to include CMP, CBC, iron/TIBC, PTH, phosphorus, A1c.  This call was conducted along with his daughter Dahiana.  Time spent 21 minutes

## 2024-10-17 ENCOUNTER — APPOINTMENT (OUTPATIENT)
Dept: NEPHROLOGY | Facility: CLINIC | Age: 69
End: 2024-10-17
Payer: MEDICARE

## 2024-10-17 VITALS — HEIGHT: 69 IN | WEIGHT: 192 LBS | BODY MASS INDEX: 28.44 KG/M2

## 2024-10-17 DIAGNOSIS — N18.5 CHRONIC KIDNEY DISEASE, STAGE 5: ICD-10-CM

## 2024-10-17 DIAGNOSIS — N25.81 SECONDARY HYPERPARATHYROIDISM OF RENAL ORIGIN: ICD-10-CM

## 2024-10-17 DIAGNOSIS — E83.39 OTHER DISORDERS OF PHOSPHORUS METABOLISM: ICD-10-CM

## 2024-10-17 DIAGNOSIS — I10 ESSENTIAL (PRIMARY) HYPERTENSION: ICD-10-CM

## 2024-10-17 DIAGNOSIS — E87.20 ACIDOSIS, UNSPECIFIED: ICD-10-CM

## 2024-10-17 DIAGNOSIS — R80.9 PROTEINURIA, UNSPECIFIED: ICD-10-CM

## 2024-10-17 PROCEDURE — 99443: CPT | Mod: 93

## 2024-11-12 ENCOUNTER — TRANSCRIPTION ENCOUNTER (OUTPATIENT)
Age: 69
End: 2024-11-12

## 2024-12-10 ENCOUNTER — APPOINTMENT (OUTPATIENT)
Dept: NEPHROLOGY | Facility: CLINIC | Age: 69
End: 2024-12-10
Payer: MEDICARE

## 2024-12-10 VITALS — WEIGHT: 187 LBS | HEIGHT: 69 IN | BODY MASS INDEX: 27.7 KG/M2

## 2024-12-10 DIAGNOSIS — D63.1 CHRONIC KIDNEY DISEASE, UNSPECIFIED: ICD-10-CM

## 2024-12-10 DIAGNOSIS — N18.9 CHRONIC KIDNEY DISEASE, UNSPECIFIED: ICD-10-CM

## 2024-12-10 DIAGNOSIS — N18.5 CHRONIC KIDNEY DISEASE, STAGE 5: ICD-10-CM

## 2024-12-10 DIAGNOSIS — I10 ESSENTIAL (PRIMARY) HYPERTENSION: ICD-10-CM

## 2024-12-10 DIAGNOSIS — E87.20 ACIDOSIS, UNSPECIFIED: ICD-10-CM

## 2024-12-10 DIAGNOSIS — E11.65 TYPE 2 DIABETES MELLITUS WITH HYPERGLYCEMIA: ICD-10-CM

## 2024-12-10 DIAGNOSIS — E83.39 OTHER DISORDERS OF PHOSPHORUS METABOLISM: ICD-10-CM

## 2024-12-10 DIAGNOSIS — N25.81 SECONDARY HYPERPARATHYROIDISM OF RENAL ORIGIN: ICD-10-CM

## 2024-12-10 PROCEDURE — 99443: CPT | Mod: 93

## 2025-01-07 ENCOUNTER — TRANSCRIPTION ENCOUNTER (OUTPATIENT)
Age: 70
End: 2025-01-07

## 2025-01-13 ENCOUNTER — TRANSCRIPTION ENCOUNTER (OUTPATIENT)
Age: 70
End: 2025-01-13

## 2025-02-10 ENCOUNTER — APPOINTMENT (OUTPATIENT)
Dept: NEPHROLOGY | Facility: CLINIC | Age: 70
End: 2025-02-10
Payer: MEDICARE

## 2025-02-10 VITALS — WEIGHT: 189 LBS | BODY MASS INDEX: 27.99 KG/M2 | HEIGHT: 69 IN

## 2025-02-10 DIAGNOSIS — I10 ESSENTIAL (PRIMARY) HYPERTENSION: ICD-10-CM

## 2025-02-10 DIAGNOSIS — E87.20 ACIDOSIS, UNSPECIFIED: ICD-10-CM

## 2025-02-10 DIAGNOSIS — N18.9 CHRONIC KIDNEY DISEASE, UNSPECIFIED: ICD-10-CM

## 2025-02-10 DIAGNOSIS — N18.4 CHRONIC KIDNEY DISEASE, STAGE 4 (SEVERE): ICD-10-CM

## 2025-02-10 DIAGNOSIS — E83.39 OTHER DISORDERS OF PHOSPHORUS METABOLISM: ICD-10-CM

## 2025-02-10 DIAGNOSIS — D63.1 CHRONIC KIDNEY DISEASE, UNSPECIFIED: ICD-10-CM

## 2025-02-10 DIAGNOSIS — R80.9 PROTEINURIA, UNSPECIFIED: ICD-10-CM

## 2025-02-10 DIAGNOSIS — N18.5 CHRONIC KIDNEY DISEASE, STAGE 5: ICD-10-CM

## 2025-02-10 PROCEDURE — G2211 COMPLEX E/M VISIT ADD ON: CPT | Mod: 2W

## 2025-02-10 PROCEDURE — 99214 OFFICE O/P EST MOD 30 MIN: CPT | Mod: 2W

## 2025-02-24 ENCOUNTER — TRANSCRIPTION ENCOUNTER (OUTPATIENT)
Age: 70
End: 2025-02-24

## 2025-03-12 ENCOUNTER — TRANSCRIPTION ENCOUNTER (OUTPATIENT)
Age: 70
End: 2025-03-12

## 2025-04-01 ENCOUNTER — TRANSCRIPTION ENCOUNTER (OUTPATIENT)
Age: 70
End: 2025-04-01

## 2025-04-02 ENCOUNTER — TRANSCRIPTION ENCOUNTER (OUTPATIENT)
Age: 70
End: 2025-04-02

## 2025-05-28 ENCOUNTER — RX RENEWAL (OUTPATIENT)
Age: 70
End: 2025-05-28

## 2025-06-03 ENCOUNTER — APPOINTMENT (OUTPATIENT)
Dept: NEPHROLOGY | Facility: CLINIC | Age: 70
End: 2025-06-03
Payer: MEDICARE

## 2025-06-03 VITALS
BODY MASS INDEX: 27.99 KG/M2 | DIASTOLIC BLOOD PRESSURE: 74 MMHG | SYSTOLIC BLOOD PRESSURE: 136 MMHG | HEIGHT: 69 IN | WEIGHT: 189 LBS

## 2025-06-03 DIAGNOSIS — N25.81 SECONDARY HYPERPARATHYROIDISM OF RENAL ORIGIN: ICD-10-CM

## 2025-06-03 DIAGNOSIS — R80.9 PROTEINURIA, UNSPECIFIED: ICD-10-CM

## 2025-06-03 DIAGNOSIS — N18.5 CHRONIC KIDNEY DISEASE, STAGE 5: ICD-10-CM

## 2025-06-03 DIAGNOSIS — D63.1 CHRONIC KIDNEY DISEASE, UNSPECIFIED: ICD-10-CM

## 2025-06-03 DIAGNOSIS — C61 MALIGNANT NEOPLASM OF PROSTATE: ICD-10-CM

## 2025-06-03 DIAGNOSIS — E87.20 ACIDOSIS, UNSPECIFIED: ICD-10-CM

## 2025-06-03 DIAGNOSIS — N18.9 CHRONIC KIDNEY DISEASE, UNSPECIFIED: ICD-10-CM

## 2025-06-03 DIAGNOSIS — I10 ESSENTIAL (PRIMARY) HYPERTENSION: ICD-10-CM

## 2025-06-03 PROCEDURE — 99215 OFFICE O/P EST HI 40 MIN: CPT

## 2025-06-03 PROCEDURE — G2211 COMPLEX E/M VISIT ADD ON: CPT

## 2025-07-14 ENCOUNTER — TRANSCRIPTION ENCOUNTER (OUTPATIENT)
Age: 70
End: 2025-07-14

## 2025-08-27 ENCOUNTER — TRANSCRIPTION ENCOUNTER (OUTPATIENT)
Age: 70
End: 2025-08-27

## 2025-09-19 ENCOUNTER — RX RENEWAL (OUTPATIENT)
Age: 70
End: 2025-09-19

## (undated) DEVICE — DRSG TEGADERM 4X10"

## (undated) DEVICE — GLV 6.5 PROTEXIS (WHITE)

## (undated) DEVICE — SUT PROLENE 7-0 18" BV

## (undated) DEVICE — PACK VASCULAR MINOR

## (undated) DEVICE — BAG DECANTER IV STERILE

## (undated) DEVICE — GLV 7 PROTEXIS (WHITE)

## (undated) DEVICE — SPECIMEN CONTAINER 4OZ

## (undated) DEVICE — WARMING BLANKET LOWER ADULT

## (undated) DEVICE — DRAPE PROBE COVER LATEX FREE 3X96"

## (undated) DEVICE — CANISTER SPECIMEN CONVERTOR PLASTIC

## (undated) DEVICE — SUT PROLENE 6-0 30" RB-2

## (undated) DEVICE — SUT SILK 2-0 12-18"

## (undated) DEVICE — DRAPE TOWEL WHITE 18" X 26"

## (undated) DEVICE — SUT SILK 2-0 18" SH (POP-OFF)

## (undated) DEVICE — CATH IV OPTIVA 16G X 2"

## (undated) DEVICE — SUT MONOCRYL 4-0 27" PS-2 UNDYED

## (undated) DEVICE — DRAIN PENROSE .5" X 18" LATEX

## (undated) DEVICE — SUT PLEDGET SOFT MEDIUM 1/4" X 1/8" X 1/16" X6

## (undated) DEVICE — SUT PROLENE 5-0 36" RB-1

## (undated) DEVICE — SUT SILK 4-0 18" TIES

## (undated) DEVICE — MARKING PEN W RULER

## (undated) DEVICE — GLV 7.5 PROTEXIS (WHITE)

## (undated) DEVICE — SUT VICRYL 2-0 27" CT-1 UNDYED

## (undated) DEVICE — DRSG DERMABOND 0.7ML

## (undated) DEVICE — NDL HYPO SAFE 25G X 1.5" (ORANGE)

## (undated) DEVICE — Device